# Patient Record
Sex: MALE | Race: WHITE | Employment: FULL TIME | URBAN - METROPOLITAN AREA
[De-identification: names, ages, dates, MRNs, and addresses within clinical notes are randomized per-mention and may not be internally consistent; named-entity substitution may affect disease eponyms.]

---

## 2017-01-13 ENCOUNTER — HOSPITAL ENCOUNTER (OUTPATIENT)
Dept: OTHER | Age: 57
Discharge: HOME | End: 2017-01-13
Attending: NURSE PRACTITIONER | Admitting: NURSE PRACTITIONER

## 2017-01-13 DIAGNOSIS — Z20.828 EXPOSURE TO SARS-ASSOCIATED CORONAVIRUS: ICD-10-CM

## 2017-04-20 ENCOUNTER — HOSPITAL ENCOUNTER (OUTPATIENT)
Dept: OTHER | Age: 57
Discharge: HOME | End: 2017-04-20
Attending: INTERNAL MEDICINE | Admitting: INTERNAL MEDICINE

## 2017-04-20 DIAGNOSIS — E78.5 ELEVATED LIPIDS: ICD-10-CM

## 2017-04-20 DIAGNOSIS — I10 ESSENTIAL HYPERTENSION: ICD-10-CM

## 2017-04-20 LAB
A/G RATIO: 1.8 (ref 1.1–2.2)
ALBUMIN SERPL-MCNC: 4.2 G/DL (ref 3.4–5)
ALP BLD-CCNC: 60 U/L (ref 40–129)
ALT SERPL-CCNC: 23 U/L (ref 10–40)
ANION GAP SERPL CALCULATED.3IONS-SCNC: 15 MMOL/L (ref 3–16)
AST SERPL-CCNC: 21 U/L (ref 15–37)
BILIRUB SERPL-MCNC: 0.3 MG/DL (ref 0–1)
BUN BLDV-MCNC: 19 MG/DL (ref 7–20)
CALCIUM SERPL-MCNC: 8.7 MG/DL (ref 8.3–10.6)
CHLORIDE BLD-SCNC: 104 MMOL/L (ref 99–110)
CHOLESTEROL, TOTAL: 156 MG/DL (ref 0–199)
CO2: 23 MMOL/L (ref 21–32)
CREAT SERPL-MCNC: 0.9 MG/DL (ref 0.9–1.3)
GFR AFRICAN AMERICAN: >60
GFR NON-AFRICAN AMERICAN: >60
GLOBULIN: 2.3 G/DL
GLUCOSE BLD-MCNC: 82 MG/DL (ref 70–99)
HDLC SERPL-MCNC: 53 MG/DL (ref 40–60)
LDL CHOLESTEROL CALCULATED: 91 MG/DL
POTASSIUM SERPL-SCNC: 3.9 MMOL/L (ref 3.5–5.1)
SODIUM BLD-SCNC: 142 MMOL/L (ref 136–145)
TOTAL PROTEIN: 6.5 G/DL (ref 6.4–8.2)
TRIGL SERPL-MCNC: 62 MG/DL (ref 0–150)
VLDLC SERPL CALC-MCNC: 12 MG/DL

## 2017-04-27 ENCOUNTER — OFFICE VISIT (OUTPATIENT)
Dept: INTERNAL MEDICINE CLINIC | Age: 57
End: 2017-04-27

## 2017-04-27 VITALS
BODY MASS INDEX: 27.96 KG/M2 | WEIGHT: 211 LBS | HEIGHT: 73 IN | OXYGEN SATURATION: 95 % | SYSTOLIC BLOOD PRESSURE: 136 MMHG | DIASTOLIC BLOOD PRESSURE: 74 MMHG | HEART RATE: 73 BPM

## 2017-04-27 DIAGNOSIS — E78.5 ELEVATED LIPIDS: ICD-10-CM

## 2017-04-27 DIAGNOSIS — G47.00 INSOMNIA, UNSPECIFIED TYPE: ICD-10-CM

## 2017-04-27 DIAGNOSIS — Z13.9 SCREENING: ICD-10-CM

## 2017-04-27 DIAGNOSIS — I10 ESSENTIAL HYPERTENSION: Primary | ICD-10-CM

## 2017-04-27 PROCEDURE — 99213 OFFICE O/P EST LOW 20 MIN: CPT | Performed by: INTERNAL MEDICINE

## 2017-04-27 RX ORDER — DOXAZOSIN MESYLATE 1 MG/1
TABLET ORAL
Qty: 180 TABLET | Refills: 3 | Status: SHIPPED | OUTPATIENT
Start: 2017-04-27 | End: 2018-04-08 | Stop reason: SDUPTHER

## 2017-06-12 ENCOUNTER — TELEPHONE (OUTPATIENT)
Dept: INTERNAL MEDICINE CLINIC | Age: 57
End: 2017-06-12

## 2017-06-12 DIAGNOSIS — G47.00 INSOMNIA, UNSPECIFIED TYPE: ICD-10-CM

## 2017-06-12 RX ORDER — NORTRIPTYLINE HYDROCHLORIDE 25 MG/1
CAPSULE ORAL
Qty: 270 CAPSULE | Refills: 3 | Status: SHIPPED | OUTPATIENT
Start: 2017-06-12 | End: 2020-01-20 | Stop reason: ALTCHOICE

## 2017-08-07 ENCOUNTER — OFFICE VISIT (OUTPATIENT)
Dept: DERMATOLOGY | Age: 57
End: 2017-08-07

## 2017-08-07 DIAGNOSIS — Z12.83 SCREENING EXAM FOR SKIN CANCER: ICD-10-CM

## 2017-08-07 DIAGNOSIS — L57.0 ACTINIC KERATOSES: Primary | ICD-10-CM

## 2017-08-07 PROCEDURE — 17003 DESTRUCT PREMALG LES 2-14: CPT | Performed by: DERMATOLOGY

## 2017-08-07 PROCEDURE — 17000 DESTRUCT PREMALG LESION: CPT | Performed by: DERMATOLOGY

## 2017-08-07 PROCEDURE — 99213 OFFICE O/P EST LOW 20 MIN: CPT | Performed by: DERMATOLOGY

## 2017-10-13 RX ORDER — AMLODIPINE BESYLATE 10 MG/1
TABLET ORAL
Qty: 45 TABLET | Refills: 3 | Status: SHIPPED | OUTPATIENT
Start: 2017-10-13 | End: 2018-11-19 | Stop reason: SDUPTHER

## 2017-10-13 RX ORDER — NIACIN 500 MG/1
TABLET, EXTENDED RELEASE ORAL
Qty: 90 TABLET | Refills: 3 | Status: SHIPPED | OUTPATIENT
Start: 2017-10-13 | End: 2018-09-24 | Stop reason: SDUPTHER

## 2017-10-21 ENCOUNTER — HOSPITAL ENCOUNTER (OUTPATIENT)
Dept: OTHER | Age: 57
Discharge: HOME | End: 2017-10-21
Attending: INTERNAL MEDICINE | Admitting: INTERNAL MEDICINE

## 2017-10-21 DIAGNOSIS — E78.5 ELEVATED LIPIDS: ICD-10-CM

## 2017-10-21 DIAGNOSIS — Z13.9 SCREENING FOR CONDITION: ICD-10-CM

## 2017-10-21 DIAGNOSIS — I10 ESSENTIAL HYPERTENSION: ICD-10-CM

## 2017-10-21 LAB
A/G RATIO: 1.6 (ref 1.1–2.2)
ALBUMIN SERPL-MCNC: 4.4 G/DL (ref 3.4–5)
ALP BLD-CCNC: 62 U/L (ref 40–129)
ALT SERPL-CCNC: 22 U/L (ref 10–40)
ANION GAP SERPL CALCULATED.3IONS-SCNC: 14 MMOL/L (ref 3–16)
AST SERPL-CCNC: 25 U/L (ref 15–37)
BILIRUB SERPL-MCNC: 0.4 MG/DL (ref 0–1)
BUN BLDV-MCNC: 15 MG/DL (ref 7–20)
CALCIUM SERPL-MCNC: 9.5 MG/DL (ref 8.3–10.6)
CHLORIDE BLD-SCNC: 101 MMOL/L (ref 99–110)
CHOLESTEROL, TOTAL: 172 MG/DL (ref 0–199)
CO2: 28 MMOL/L (ref 21–32)
CREAT SERPL-MCNC: 1 MG/DL (ref 0.9–1.3)
GFR AFRICAN AMERICAN: >60
GFR NON-AFRICAN AMERICAN: >60
GLOBULIN: 2.8 G/DL
GLUCOSE BLD-MCNC: 70 MG/DL (ref 70–99)
HDLC SERPL-MCNC: 53 MG/DL (ref 40–60)
LDL CHOLESTEROL CALCULATED: 92 MG/DL
POTASSIUM SERPL-SCNC: 4.1 MMOL/L (ref 3.5–5.1)
PROSTATE SPECIFIC ANTIGEN: 0.73 NG/ML (ref 0–4)
SODIUM BLD-SCNC: 143 MMOL/L (ref 136–145)
TOTAL PROTEIN: 7.2 G/DL (ref 6.4–8.2)
TRIGL SERPL-MCNC: 133 MG/DL (ref 0–150)
VLDLC SERPL CALC-MCNC: 27 MG/DL

## 2017-10-27 ENCOUNTER — OFFICE VISIT (OUTPATIENT)
Dept: INTERNAL MEDICINE CLINIC | Age: 57
End: 2017-10-27

## 2017-10-27 VITALS
OXYGEN SATURATION: 97 % | BODY MASS INDEX: 28.49 KG/M2 | DIASTOLIC BLOOD PRESSURE: 76 MMHG | HEART RATE: 86 BPM | SYSTOLIC BLOOD PRESSURE: 128 MMHG | WEIGHT: 215 LBS | HEIGHT: 73 IN

## 2017-10-27 DIAGNOSIS — Z23 NEED FOR IMMUNIZATION AGAINST INFLUENZA: ICD-10-CM

## 2017-10-27 DIAGNOSIS — G47.00 INSOMNIA, UNSPECIFIED TYPE: ICD-10-CM

## 2017-10-27 DIAGNOSIS — G47.33 OBSTRUCTIVE SLEEP APNEA SYNDROME: ICD-10-CM

## 2017-10-27 DIAGNOSIS — E78.5 ELEVATED LIPIDS: ICD-10-CM

## 2017-10-27 DIAGNOSIS — I10 ESSENTIAL HYPERTENSION: Primary | ICD-10-CM

## 2017-10-27 PROCEDURE — 99214 OFFICE O/P EST MOD 30 MIN: CPT | Performed by: INTERNAL MEDICINE

## 2017-10-27 PROCEDURE — 90471 IMMUNIZATION ADMIN: CPT | Performed by: INTERNAL MEDICINE

## 2017-10-27 PROCEDURE — 90688 IIV4 VACCINE SPLT 0.5 ML IM: CPT | Performed by: INTERNAL MEDICINE

## 2018-04-08 DIAGNOSIS — I10 ESSENTIAL HYPERTENSION: ICD-10-CM

## 2018-04-09 RX ORDER — DOXAZOSIN MESYLATE 1 MG/1
TABLET ORAL
Qty: 180 TABLET | Refills: 3 | Status: SHIPPED | OUTPATIENT
Start: 2018-04-09 | End: 2018-10-12 | Stop reason: SDUPTHER

## 2018-04-23 ENCOUNTER — HOSPITAL ENCOUNTER (OUTPATIENT)
Dept: OTHER | Age: 58
Discharge: HOME | End: 2018-04-23
Attending: INTERNAL MEDICINE | Admitting: INTERNAL MEDICINE

## 2018-04-23 DIAGNOSIS — I10 ESSENTIAL HYPERTENSION: ICD-10-CM

## 2018-04-23 DIAGNOSIS — E78.5 ELEVATED LIPIDS: ICD-10-CM

## 2018-04-23 LAB
A/G RATIO: 1.8 (ref 1.1–2.2)
ALBUMIN SERPL-MCNC: 4.7 G/DL (ref 3.4–5)
ALP BLD-CCNC: 64 U/L (ref 40–129)
ALT SERPL-CCNC: 33 U/L (ref 10–40)
ANION GAP SERPL CALCULATED.3IONS-SCNC: 16 MMOL/L (ref 3–16)
AST SERPL-CCNC: 49 U/L (ref 15–37)
BILIRUB SERPL-MCNC: 0.5 MG/DL (ref 0–1)
BUN BLDV-MCNC: 21 MG/DL (ref 7–20)
CALCIUM SERPL-MCNC: 9.2 MG/DL (ref 8.3–10.6)
CHLORIDE BLD-SCNC: 106 MMOL/L (ref 99–110)
CHOLESTEROL, TOTAL: 150 MG/DL (ref 0–199)
CO2: 24 MMOL/L (ref 21–32)
CREAT SERPL-MCNC: 0.9 MG/DL (ref 0.9–1.3)
GFR AFRICAN AMERICAN: >60
GFR NON-AFRICAN AMERICAN: >60
GLOBULIN: 2.6 G/DL
GLUCOSE BLD-MCNC: 86 MG/DL (ref 70–99)
HDLC SERPL-MCNC: 55 MG/DL (ref 40–60)
LDL CHOLESTEROL CALCULATED: 80 MG/DL
POTASSIUM SERPL-SCNC: 4 MMOL/L (ref 3.5–5.1)
SODIUM BLD-SCNC: 146 MMOL/L (ref 136–145)
TOTAL PROTEIN: 7.3 G/DL (ref 6.4–8.2)
TRIGL SERPL-MCNC: 76 MG/DL (ref 0–150)
VLDLC SERPL CALC-MCNC: 15 MG/DL

## 2018-04-27 ENCOUNTER — OFFICE VISIT (OUTPATIENT)
Dept: INTERNAL MEDICINE CLINIC | Age: 58
End: 2018-04-27

## 2018-04-27 VITALS
HEART RATE: 79 BPM | OXYGEN SATURATION: 98 % | BODY MASS INDEX: 29.03 KG/M2 | DIASTOLIC BLOOD PRESSURE: 82 MMHG | SYSTOLIC BLOOD PRESSURE: 136 MMHG | WEIGHT: 217 LBS

## 2018-04-27 DIAGNOSIS — G47.00 INSOMNIA, UNSPECIFIED TYPE: ICD-10-CM

## 2018-04-27 DIAGNOSIS — N52.9 ERECTILE DYSFUNCTION, UNSPECIFIED ERECTILE DYSFUNCTION TYPE: ICD-10-CM

## 2018-04-27 DIAGNOSIS — I10 ESSENTIAL HYPERTENSION: Primary | ICD-10-CM

## 2018-04-27 DIAGNOSIS — G47.33 OBSTRUCTIVE SLEEP APNEA SYNDROME: ICD-10-CM

## 2018-04-27 DIAGNOSIS — G25.81 RLS (RESTLESS LEGS SYNDROME): ICD-10-CM

## 2018-04-27 DIAGNOSIS — E78.5 ELEVATED LIPIDS: ICD-10-CM

## 2018-04-27 DIAGNOSIS — Z12.5 SPECIAL SCREENING FOR MALIGNANT NEOPLASM OF PROSTATE: ICD-10-CM

## 2018-04-27 PROCEDURE — 99214 OFFICE O/P EST MOD 30 MIN: CPT | Performed by: INTERNAL MEDICINE

## 2018-04-27 RX ORDER — SILDENAFIL CITRATE 20 MG/1
20 TABLET ORAL PRN
Qty: 20 TABLET | Refills: 3 | Status: SHIPPED | OUTPATIENT
Start: 2018-04-27 | End: 2020-01-20 | Stop reason: ALTCHOICE

## 2018-04-27 ASSESSMENT — PATIENT HEALTH QUESTIONNAIRE - PHQ9
SUM OF ALL RESPONSES TO PHQ9 QUESTIONS 1 & 2: 0
SUM OF ALL RESPONSES TO PHQ QUESTIONS 1-9: 0
2. FEELING DOWN, DEPRESSED OR HOPELESS: 0
1. LITTLE INTEREST OR PLEASURE IN DOING THINGS: 0

## 2018-08-13 ENCOUNTER — OFFICE VISIT (OUTPATIENT)
Dept: DERMATOLOGY | Age: 58
End: 2018-08-13

## 2018-08-13 DIAGNOSIS — M67.449 DIGITAL MUCINOUS CYST: ICD-10-CM

## 2018-08-13 DIAGNOSIS — L57.0 ACTINIC KERATOSES: Primary | ICD-10-CM

## 2018-08-13 DIAGNOSIS — Z12.83 SCREENING EXAM FOR SKIN CANCER: ICD-10-CM

## 2018-08-13 PROCEDURE — 17003 DESTRUCT PREMALG LES 2-14: CPT | Performed by: DERMATOLOGY

## 2018-08-13 PROCEDURE — 99213 OFFICE O/P EST LOW 20 MIN: CPT | Performed by: DERMATOLOGY

## 2018-08-13 PROCEDURE — 17000 DESTRUCT PREMALG LESION: CPT | Performed by: DERMATOLOGY

## 2018-08-13 NOTE — PROGRESS NOTES
[facility]) Dermatology  Meg Mancia MD  [phone]      Karishma Aid  1960    63 y.o. male     Date of Visit: 8/13/2018    Last Visit: 1yr    Chief Complaint: Skin check    History of Present Illness:  1. Here for skin check. History of actinic keratoses s/p cryotherapy. Unsure of new lesions   -Wears hat regularly. Does not like to use sunscreen. 2. Concerned about a raised lesion on R 3rd finger. No associated symptoms     Review of Systems:  Constitutional: Reports general sense of well-being. Skin: No interval severe sunburns. Allergies: Reviewed and updated. Past Medical History, Surgical History, Medications and Allergies reviewed.      Past Medical History:   Diagnosis Date    Allergic rhinitis     Chronic back pain     Chronic sinus infection     GERD (gastroesophageal reflux disease)     Hyperlipidemia     Hypertension     IBS (irritable bowel syndrome)     Insomnia     Irritable bladder     RLS (restless legs syndrome) 9/21/2012    Sleep apnea     uses mouthguard    Wears glasses      Past Surgical History:   Procedure Laterality Date    CATARACT REMOVAL WITH IMPLANT Right     COLONOSCOPY  12/2003    normal    COLONOSCOPY  10/24/14    NL Redo 10 yrs    CYST REMOVAL  1995    sebacous cyst head    EYE SURGERY Left 9/16/14    Phaco emulsification with IOL implant    SPINE SURGERY  2001    L5-S1 discectomy    SPINE SURGERY  2010    L4-L5 discectomy    TONSILLECTOMY AND ADENOIDECTOMY  1971       Allergies   Allergen Reactions    Entex [Ami-Selvin] Other (See Comments)     Difficulty urinating  insomnia     Outpatient Prescriptions Marked as Taking for the 8/13/18 encounter (Office Visit) with Meg Mancia MD   Medication Sig Dispense Refill    sildenafil (REVATIO) 20 MG tablet Take 1 tablet by mouth as needed (ED) 20 tablet 3    doxazosin (CARDURA) 1 MG tablet TAKE 1 TABLET TWICE A DAY FOR HYPERTENSION AND PROSTATE MEDICINE 180 tablet 3    amLODIPine (NORVASC) 10 MG tablet TAKE ONE-HALF (1/2) TABLET DAILY FOR HYPERTENSION 45 tablet 3    niacin (NIASPAN) 500 MG extended release tablet TAKE 1 TABLET NIGHTLY FOR HIGH CHOLESTEROL 90 tablet 3    gabapentin (NEURONTIN) 600 MG tablet TAKE ONE-HALF (1/2) TO 1 TABLET AT BEDTIME FOR NERVE PAIN / SLEEP. UNUSED HALF TABLET SHOULD BE TAKEN AS THE NEXT DOSE 90 tablet 3    nortriptyline (PAMELOR) 25 MG capsule TAKE TWO TO THREE CAPSULES BY MOUTH EVERY NIGHT AT BEDTIME AS NEEDED FOR SLEEP 270 capsule 3    Coenzyme Q10 (CO Q 10) 100 MG CAPS Take 1 tablet by mouth daily. Social History:     Physical Examination     The following were examined and determined to be normal: Psych/Neuro, Scalp/hair, Conjunctivae/eyelids, Gums/teeth/lips, Neck, Breast/axilla/chest, Abdomen, Back, RUE, LUE, RLE, LLE, and Genitalia/groin/buttocks. The following were examined and determined to be abnormal: Head/face. Nails/digits    -General: Well-appearing, NAD  1. R 1 and L 3 temples - ill-defined irregularly-shaped roughly-scaling thin pink macule(s)/papule(s)  2. Dorsum R 3rd finger at DIP joint - 4mm smooth firm skin-colored/translucent nodule     Assessment and Plan     1. Actinic keratosis(es)  -Edu re: relationship with chronic cumulative sun exposure, low premalignant potential.   -4 lesion(s) treated w/ liquid nitrogen x 2 cycles - R 1 and L 3 temples. Edu re: risk of blister formation, discomfort, scar, hypopigmentation. Discussed wound care. -Reviewed sun protective behavior -- sun avoidance during the peak hours of the day, sun-protective clothing (including hat and sunglasses), sunscreen use (water resistant, broad spectrum, SPF at least 30, need for reapplication every 2 to 3 hours). -Return for full skin exam in 1 year (sooner if indicated)      2. Nodule of R 3rd finger, likely digital mucinous cyst   -Reassurance re: benignity   -Discussed excision as only definitive treatment.  Pt not interested in treatment given no associated

## 2018-08-14 ENCOUNTER — TELEPHONE (OUTPATIENT)
Dept: INTERNAL MEDICINE CLINIC | Age: 58
End: 2018-08-14

## 2018-08-16 RX ORDER — KETOCONAZOLE 20 MG/G
CREAM TOPICAL
Qty: 30 G | Refills: 0 | Status: SHIPPED | OUTPATIENT
Start: 2018-08-16 | End: 2020-01-20 | Stop reason: ALTCHOICE

## 2018-09-24 RX ORDER — NIACIN 500 MG/1
TABLET, EXTENDED RELEASE ORAL
Qty: 90 TABLET | Refills: 3 | Status: SHIPPED | OUTPATIENT
Start: 2018-09-24 | End: 2019-08-15 | Stop reason: SDUPTHER

## 2018-10-08 RX ORDER — GABAPENTIN 600 MG/1
TABLET ORAL
Qty: 90 TABLET | Refills: 3 | Status: SHIPPED | OUTPATIENT
Start: 2018-10-08 | End: 2019-11-01 | Stop reason: SDUPTHER

## 2018-10-12 DIAGNOSIS — I10 ESSENTIAL HYPERTENSION: ICD-10-CM

## 2018-10-13 RX ORDER — DOXAZOSIN MESYLATE 1 MG/1
TABLET ORAL
Qty: 180 TABLET | Refills: 3 | Status: SHIPPED | OUTPATIENT
Start: 2018-10-13 | End: 2018-11-20 | Stop reason: SDUPTHER

## 2018-10-31 NOTE — PROGRESS NOTES
Vaccine Information Sheet, \"Influenza - Inactivated\"  given to Amber Miller, or parent/legal guardian of  Amber Miller and verbalized understanding. Patient responses:    Have you ever had a reaction to a flu vaccine? No  Are you able to eat eggs without adverse effects? Yes  Do you have any current illness? No  Have you ever had [place] Syndrome? No    Flu vaccine given per order. Please see immunization tab.

## 2018-11-02 ENCOUNTER — NURSE ONLY (OUTPATIENT)
Dept: INTERNAL MEDICINE CLINIC | Age: 58
End: 2018-11-02
Payer: COMMERCIAL

## 2018-11-02 ENCOUNTER — HOSPITAL ENCOUNTER (OUTPATIENT)
Age: 58
Discharge: HOME | End: 2018-11-02
Payer: COMMERCIAL

## 2018-11-02 DIAGNOSIS — I10 ESSENTIAL HYPERTENSION: ICD-10-CM

## 2018-11-02 DIAGNOSIS — Z23 NEED FOR PROPHYLACTIC VACCINATION AND INOCULATION AGAINST INFLUENZA: Primary | ICD-10-CM

## 2018-11-02 DIAGNOSIS — E78.5 ELEVATED LIPIDS: ICD-10-CM

## 2018-11-02 DIAGNOSIS — Z12.5 SPECIAL SCREENING FOR MALIGNANT NEOPLASM OF PROSTATE: ICD-10-CM

## 2018-11-02 LAB
A/G RATIO: 1.8 (ref 1.1–2.2)
ALBUMIN SERPL-MCNC: 4.7 G/DL (ref 3.4–5)
ALP BLD-CCNC: 68 U/L (ref 40–129)
ALT SERPL-CCNC: 25 U/L (ref 10–40)
ANION GAP SERPL CALCULATED.3IONS-SCNC: 12 MMOL/L (ref 3–16)
AST SERPL-CCNC: 24 U/L (ref 15–37)
BILIRUB SERPL-MCNC: 0.3 MG/DL (ref 0–1)
BUN BLDV-MCNC: 17 MG/DL (ref 7–20)
CALCIUM SERPL-MCNC: 9.3 MG/DL (ref 8.3–10.6)
CHLORIDE BLD-SCNC: 103 MMOL/L (ref 99–110)
CHOLESTEROL, TOTAL: 173 MG/DL (ref 0–199)
CO2: 27 MMOL/L (ref 21–32)
CREAT SERPL-MCNC: 1.1 MG/DL (ref 0.9–1.3)
GFR AFRICAN AMERICAN: >60
GFR NON-AFRICAN AMERICAN: >60
GLOBULIN: 2.6 G/DL
GLUCOSE BLD-MCNC: 93 MG/DL (ref 70–99)
HDLC SERPL-MCNC: 54 MG/DL (ref 40–60)
LDL CHOLESTEROL CALCULATED: 102 MG/DL
POTASSIUM SERPL-SCNC: 4.7 MMOL/L (ref 3.5–5.1)
PROSTATE SPECIFIC ANTIGEN: 0.64 NG/ML (ref 0–4)
SODIUM BLD-SCNC: 142 MMOL/L (ref 136–145)
TOTAL PROTEIN: 7.3 G/DL (ref 6.4–8.2)
TRIGL SERPL-MCNC: 85 MG/DL (ref 0–150)
VLDLC SERPL CALC-MCNC: 17 MG/DL

## 2018-11-02 PROCEDURE — 84153 ASSAY OF PSA TOTAL: CPT

## 2018-11-02 PROCEDURE — 90471 IMMUNIZATION ADMIN: CPT | Performed by: INTERNAL MEDICINE

## 2018-11-02 PROCEDURE — 36415 COLL VENOUS BLD VENIPUNCTURE: CPT

## 2018-11-02 PROCEDURE — 90686 IIV4 VACC NO PRSV 0.5 ML IM: CPT | Performed by: INTERNAL MEDICINE

## 2018-11-02 PROCEDURE — 80061 LIPID PANEL: CPT

## 2018-11-02 PROCEDURE — 80053 COMPREHEN METABOLIC PANEL: CPT

## 2018-11-19 ENCOUNTER — OFFICE VISIT (OUTPATIENT)
Dept: INTERNAL MEDICINE CLINIC | Age: 58
End: 2018-11-19
Payer: COMMERCIAL

## 2018-11-19 ENCOUNTER — HOSPITAL ENCOUNTER (OUTPATIENT)
Dept: GENERAL RADIOLOGY | Age: 58
Discharge: HOME | End: 2018-11-19
Payer: COMMERCIAL

## 2018-11-19 ENCOUNTER — HOSPITAL ENCOUNTER (OUTPATIENT)
Age: 58
Discharge: HOME | End: 2018-11-19
Payer: COMMERCIAL

## 2018-11-19 VITALS
OXYGEN SATURATION: 98 % | WEIGHT: 218 LBS | SYSTOLIC BLOOD PRESSURE: 144 MMHG | DIASTOLIC BLOOD PRESSURE: 84 MMHG | HEIGHT: 73 IN | BODY MASS INDEX: 28.89 KG/M2 | HEART RATE: 82 BPM

## 2018-11-19 DIAGNOSIS — M25.531 RIGHT WRIST PAIN: ICD-10-CM

## 2018-11-19 DIAGNOSIS — I10 ESSENTIAL HYPERTENSION: Primary | ICD-10-CM

## 2018-11-19 DIAGNOSIS — N52.9 ERECTILE DYSFUNCTION, UNSPECIFIED ERECTILE DYSFUNCTION TYPE: ICD-10-CM

## 2018-11-19 DIAGNOSIS — G25.81 RLS (RESTLESS LEGS SYNDROME): ICD-10-CM

## 2018-11-19 DIAGNOSIS — G47.33 OBSTRUCTIVE SLEEP APNEA SYNDROME: ICD-10-CM

## 2018-11-19 DIAGNOSIS — E78.5 ELEVATED LIPIDS: ICD-10-CM

## 2018-11-19 DIAGNOSIS — M54.50 ACUTE MIDLINE LOW BACK PAIN WITHOUT SCIATICA: ICD-10-CM

## 2018-11-19 PROCEDURE — 99214 OFFICE O/P EST MOD 30 MIN: CPT | Performed by: INTERNAL MEDICINE

## 2018-11-19 PROCEDURE — 73100 X-RAY EXAM OF WRIST: CPT

## 2018-11-19 RX ORDER — AMLODIPINE BESYLATE 10 MG/1
TABLET ORAL
Qty: 90 TABLET | Refills: 3 | Status: SHIPPED | OUTPATIENT
Start: 2018-11-19 | End: 2019-11-01 | Stop reason: SDUPTHER

## 2018-11-20 ENCOUNTER — TELEPHONE (OUTPATIENT)
Dept: INTERNAL MEDICINE CLINIC | Age: 58
End: 2018-11-20

## 2018-11-20 DIAGNOSIS — I10 ESSENTIAL HYPERTENSION: ICD-10-CM

## 2018-11-20 RX ORDER — DOXAZOSIN MESYLATE 1 MG/1
TABLET ORAL
Qty: 180 TABLET | Refills: 3 | Status: SHIPPED | OUTPATIENT
Start: 2018-11-20 | End: 2018-11-29 | Stop reason: SDUPTHER

## 2018-11-20 NOTE — TELEPHONE ENCOUNTER
Patient had Xray last night and would like a call daiana with results. Please call [phone] with results    He also said that [facility] only [facility] Doxazosin for 45 days  Unless PCP calls them about 90 day refills. Expecting a fax from the pharmacy regarding this.

## 2018-11-29 ENCOUNTER — TELEPHONE (OUTPATIENT)
Dept: INTERNAL MEDICINE CLINIC | Age: 58
End: 2018-11-29

## 2018-11-29 DIAGNOSIS — I10 ESSENTIAL HYPERTENSION: ICD-10-CM

## 2018-11-29 RX ORDER — DOXAZOSIN MESYLATE 4 MG/1
TABLET ORAL
Qty: 90 TABLET | Refills: 2 | Status: SHIPPED | OUTPATIENT
Start: 2018-11-29 | End: 2019-08-15 | Stop reason: SDUPTHER

## 2018-12-03 ENCOUNTER — TELEPHONE (OUTPATIENT)
Dept: INTERNAL MEDICINE CLINIC | Age: 58
End: 2018-12-03

## 2019-01-19 ENCOUNTER — TELEPHONE (OUTPATIENT)
Dept: INTERNAL MEDICINE CLINIC | Age: 59
End: 2019-01-19

## 2019-05-20 ENCOUNTER — HOSPITAL ENCOUNTER (OUTPATIENT)
Age: 59
Discharge: HOME | End: 2019-05-20
Payer: COMMERCIAL

## 2019-05-20 DIAGNOSIS — E78.5 ELEVATED LIPIDS: ICD-10-CM

## 2019-05-20 DIAGNOSIS — I10 ESSENTIAL HYPERTENSION: ICD-10-CM

## 2019-05-20 LAB
A/G RATIO: 1.8 (ref 1.1–2.2)
ALBUMIN SERPL-MCNC: 4.3 G/DL (ref 3.4–5)
ALP BLD-CCNC: 66 U/L (ref 40–129)
ALT SERPL-CCNC: 25 U/L (ref 10–40)
ANION GAP SERPL CALCULATED.3IONS-SCNC: 11 MMOL/L (ref 3–16)
AST SERPL-CCNC: 28 U/L (ref 15–37)
BILIRUB SERPL-MCNC: 0.5 MG/DL (ref 0–1)
BUN BLDV-MCNC: 19 MG/DL (ref 7–20)
CALCIUM SERPL-MCNC: 9.1 MG/DL (ref 8.3–10.6)
CHLORIDE BLD-SCNC: 108 MMOL/L (ref 99–110)
CHOLESTEROL, TOTAL: 133 MG/DL (ref 0–199)
CO2: 26 MMOL/L (ref 21–32)
CREAT SERPL-MCNC: 0.8 MG/DL (ref 0.9–1.3)
GFR AFRICAN AMERICAN: >60
GFR NON-AFRICAN AMERICAN: >60
GLOBULIN: 2.4 G/DL
GLUCOSE BLD-MCNC: 96 MG/DL (ref 70–99)
HDLC SERPL-MCNC: 55 MG/DL (ref 40–60)
LDL CHOLESTEROL CALCULATED: 66 MG/DL
POTASSIUM SERPL-SCNC: 4.4 MMOL/L (ref 3.5–5.1)
SODIUM BLD-SCNC: 145 MMOL/L (ref 136–145)
TOTAL PROTEIN: 6.7 G/DL (ref 6.4–8.2)
TRIGL SERPL-MCNC: 59 MG/DL (ref 0–150)
VLDLC SERPL CALC-MCNC: 12 MG/DL

## 2019-05-20 PROCEDURE — 36415 COLL VENOUS BLD VENIPUNCTURE: CPT

## 2019-05-20 PROCEDURE — 80053 COMPREHEN METABOLIC PANEL: CPT

## 2019-05-20 PROCEDURE — 80061 LIPID PANEL: CPT

## 2019-05-24 ENCOUNTER — OFFICE VISIT (OUTPATIENT)
Dept: INTERNAL MEDICINE CLINIC | Age: 59
End: 2019-05-24
Payer: COMMERCIAL

## 2019-05-24 VITALS
BODY MASS INDEX: 26.9 KG/M2 | HEART RATE: 79 BPM | WEIGHT: 203 LBS | DIASTOLIC BLOOD PRESSURE: 86 MMHG | OXYGEN SATURATION: 98 % | HEIGHT: 73 IN | SYSTOLIC BLOOD PRESSURE: 134 MMHG

## 2019-05-24 DIAGNOSIS — E78.5 ELEVATED LIPIDS: ICD-10-CM

## 2019-05-24 DIAGNOSIS — I10 ESSENTIAL HYPERTENSION: Primary | ICD-10-CM

## 2019-05-24 PROCEDURE — 99214 OFFICE O/P EST MOD 30 MIN: CPT | Performed by: NURSE PRACTITIONER

## 2019-05-24 ASSESSMENT — PATIENT HEALTH QUESTIONNAIRE - PHQ9
SUM OF ALL RESPONSES TO PHQ QUESTIONS 1-9: 0
SUM OF ALL RESPONSES TO PHQ QUESTIONS 1-9: 0
SUM OF ALL RESPONSES TO PHQ9 QUESTIONS 1 & 2: 0
1. LITTLE INTEREST OR PLEASURE IN DOING THINGS: 0
2. FEELING DOWN, DEPRESSED OR HOPELESS: 0

## 2019-05-24 ASSESSMENT — ENCOUNTER SYMPTOMS
ABDOMINAL PAIN: 0
VOMITING: 0
BLOOD IN STOOL: 0
NAUSEA: 0
CONSTIPATION: 0
COUGH: 0
DIARRHEA: 0
WHEEZING: 0
EYE DISCHARGE: 0
SHORTNESS OF BREATH: 0

## 2019-05-24 NOTE — PROGRESS NOTES
05/24/19    Andree Bowman  63 y.o.  male    Chief Complaint   Patient presents with    Hypertension    Hyperlipidemia         HPI:     Pt presents for 6 month evaluation of HTN, insomnia, JUSTICE and ED. HTN: at last OV norvasc was increased to 10 mg daily. Over the last 6 months he has lost 15 pounds. BP WNL today. INSOMNIA: no new complaints, still using gabapentin and  pamelor without any problem. JUSTICE: uses mouth appliance without difficulty. Wife never notices apneic episodes. He feels that he sleeps well.     ED: Uses medications PRN    Lab Results   Component Value Date     05/20/2019    K 4.4 05/20/2019     05/20/2019    CO2 26 05/20/2019    BUN 19 05/20/2019    CREATININE 0.8 (L) 05/20/2019    GLUCOSE 96 05/20/2019    CALCIUM 9.1 05/20/2019    PROT 6.7 05/20/2019    LABALBU 4.3 05/20/2019    BILITOT 0.5 05/20/2019    ALKPHOS 66 05/20/2019    AST 28 05/20/2019    ALT 25 05/20/2019    LABGLOM >60 05/20/2019    GFRAA >60 05/20/2019    AGRATIO 1.8 05/20/2019    GLOB 2.4 05/20/2019     Lab Results   Component Value Date    CHOL 133 05/20/2019    CHOL 173 11/02/2018    CHOL 150 04/23/2018     Lab Results   Component Value Date    TRIG 59 05/20/2019    TRIG 85 11/02/2018    TRIG 76 04/23/2018     Lab Results   Component Value Date    HDL 55 05/20/2019    HDL 54 11/02/2018    HDL 55 04/23/2018     Lab Results   Component Value Date    LDLCALC 66 05/20/2019    LDLCALC 102 (H) 11/02/2018    LDLCALC 80 04/23/2018     Lab Results   Component Value Date    LABVLDL 12 05/20/2019    LABVLDL 17 11/02/2018    LABVLDL 15 04/23/2018         /86 (Site: Right Upper Arm, Position: Sitting, Cuff Size: Medium Adult)   Pulse 79   Ht 6' 0.5\" (1.842 m)   Wt 203 lb (92.1 kg)   SpO2 98%   BMI 27.15 kg/m²        Current Outpatient Medications   Medication Sig Dispense Refill    doxazosin (CARDURA) 4 MG tablet TAKE 1/2 TABLET TWICE A DAY FOR HYPERTENSION AND PROSTATE MEDICINE 90 tablet 2    amLODIPine (NORVASC) 10 MG tablet TAKE ONE TABLET DAILY FOR HYPERTENSION 90 tablet 3    niacin (NIASPAN) 500 MG extended release tablet TAKE 1 TABLET NIGHTLY FOR HIGH CHOLESTEROL 90 tablet 3    ketoconazole (NIZORAL) 2 % cream Apply topically daily x 2 weeks. As needed. 30 g 0    sildenafil (REVATIO) 20 MG tablet Take 1 tablet by mouth as needed (ED) 20 tablet 3    nortriptyline (PAMELOR) 25 MG capsule TAKE TWO TO THREE CAPSULES BY MOUTH EVERY NIGHT AT BEDTIME AS NEEDED FOR SLEEP 270 capsule 3    Coenzyme Q10 (CO Q 10) 100 MG CAPS Take 1 tablet by mouth daily.  gabapentin (NEURONTIN) 600 MG tablet TAKE ONE-HALF (1/2) TO 1 TABLET AT BEDTIME FOR NERVE PAIN / SLEEP. UNUSED HALF TABLET SHOULD BE TAKEN AS THE NEXT DOSE 90 tablet 3     No current facility-administered medications for this visit.         Social History     Socioeconomic History    Marital status:      Spouse name: Not on file    Number of children: Not on file    Years of education: Not on file    Highest education level: Not on file   Occupational History    Not on file   Social Needs    Financial resource strain: Not on file    Food insecurity:     Worry: Not on file     Inability: Not on file    Transportation needs:     Medical: Not on file     Non-medical: Not on file   Tobacco Use    Smoking status: Never Smoker    Smokeless tobacco: Never Used   Substance and Sexual Activity    Alcohol use: Yes     Comment: once a week    Drug use: No    Sexual activity: Yes     Partners: Female   Lifestyle    Physical activity:     Days per week: Not on file     Minutes per session: Not on file    Stress: Not on file   Relationships    Social connections:     Talks on phone: Not on file     Gets together: Not on file     Attends Jew service: Not on file     Active member of club or organization: Not on file     Attends meetings of clubs or organizations: Not on file     Relationship status: Not on file    Intimate partner violence: Fear of current or ex partner: Not on file     Emotionally abused: Not on file     Physically abused: Not on file     Forced sexual activity: Not on file   Other Topics Concern    Not on file   Social History Narrative    Not on file       Family History   Problem Relation Age of Onset    Cancer Mother         breast    High Blood Pressure Mother     Thyroid Disease Mother     Heart Disease Father 72        PTCA / CAD    Diabetes Father     Kidney Disease Father         kidney stones    High Cholesterol Father     Other Father         endartectomy    Cancer Sister         skin cancer       Past Medical History:   Diagnosis Date    Allergic rhinitis     Chronic back pain     Chronic sinus infection     GERD (gastroesophageal reflux disease)     Hyperlipidemia     Hypertension     IBS (irritable bowel syndrome)     Insomnia     Irritable bladder     RLS (restless legs syndrome) 9/21/2012    Sleep apnea     uses mouthguard    Wears glasses        Review of Systems   Constitutional: Negative for fever. HENT: Negative for congestion. Eyes: Negative for discharge. Respiratory: Negative for cough, shortness of breath and wheezing. Cardiovascular: Negative for chest pain, palpitations and leg swelling. Gastrointestinal: Negative for abdominal pain, blood in stool, constipation, diarrhea, nausea and vomiting. Genitourinary: Negative for dysuria and hematuria. Musculoskeletal: Negative for myalgias. Skin: Negative for rash. Neurological: Negative for dizziness. Psychiatric/Behavioral: The patient is not nervous/anxious. Physical Exam   Constitutional: He is oriented to person, place, and time. No distress. HENT:   Right Ear: External ear normal.   Left Ear: External ear normal.   Mouth/Throat: Oropharynx is clear and moist. No oropharyngeal exudate. Eyes: Right eye exhibits no discharge. Left eye exhibits no discharge. No scleral icterus. Neck: Normal range of motion. No thyromegaly present. Cardiovascular: Normal rate, regular rhythm, normal heart sounds and intact distal pulses. Exam reveals no gallop and no friction rub. No murmur heard. Pulmonary/Chest: Effort normal and breath sounds normal. He has no wheezes. Abdominal: Soft. Bowel sounds are normal. He exhibits no distension. There is no tenderness. Musculoskeletal: Normal range of motion. He exhibits no edema or tenderness. Lymphadenopathy:     He has no cervical adenopathy. Neurological: He is alert and oriented to person, place, and time. Skin: Skin is warm and dry. No rash noted. He is not diaphoretic. No erythema. Psychiatric: Judgment normal.     1. Essential hypertension  Reviewed improvement in numbers  Continue current medications  Continue to follow healthier guidelines in food choices  - Comprehensive Metabolic Panel; Future    2. Elevated lipids  Reviewed labs with very good results  Praised him again for the lifestyle changes that have improved his health status  Continue niacin   Continue  - Lipid Panel;  Future    Repeat labs in 6 months, then return to see KATIE Zamudio - CNP

## 2019-08-15 DIAGNOSIS — I10 ESSENTIAL HYPERTENSION: ICD-10-CM

## 2019-08-15 RX ORDER — DOXAZOSIN MESYLATE 4 MG/1
TABLET ORAL
Qty: 90 TABLET | Refills: 4 | Status: SHIPPED | OUTPATIENT
Start: 2019-08-15 | End: 2020-09-09

## 2019-08-15 RX ORDER — NIACIN 500 MG/1
TABLET, EXTENDED RELEASE ORAL
Qty: 90 TABLET | Refills: 4 | Status: SHIPPED | OUTPATIENT
Start: 2019-08-15 | End: 2020-09-09

## 2019-08-15 NOTE — TELEPHONE ENCOUNTER
Refill request for NIACIN ER TABS 500MG last refilled on 9/24/18 and DOXAZOSIN MESYL TABS 4MG last refilled on 11/29/18.      Name of [facility]    Last visit - 5/24/2019       Pending visit -   Future Appointments   Date Time Provider Pal Mariana   12/2/2019  4:00 PM MD UYEN Clarke AND DANIEL QIU   1/28/2020  3:00 PM MD Nato Royal       Medication Contract signed -4/27/18   Last Oarrs ran-     Additional Comments

## 2019-11-26 ENCOUNTER — HOSPITAL ENCOUNTER (OUTPATIENT)
Age: 59
Discharge: HOME | End: 2019-11-26
Payer: COMMERCIAL

## 2019-11-26 ENCOUNTER — NURSE ONLY (OUTPATIENT)
Dept: INTERNAL MEDICINE CLINIC | Age: 59
End: 2019-11-26
Payer: COMMERCIAL

## 2019-11-26 DIAGNOSIS — I10 ESSENTIAL HYPERTENSION: ICD-10-CM

## 2019-11-26 DIAGNOSIS — E78.5 ELEVATED LIPIDS: ICD-10-CM

## 2019-11-26 LAB
A/G RATIO: 1.7 (ref 1.1–2.2)
ALBUMIN SERPL-MCNC: 4.3 G/DL (ref 3.4–5)
ALP BLD-CCNC: 66 U/L (ref 40–129)
ALT SERPL-CCNC: 24 U/L (ref 10–40)
ANION GAP SERPL CALCULATED.3IONS-SCNC: 11 MMOL/L (ref 3–16)
AST SERPL-CCNC: 24 U/L (ref 15–37)
BILIRUB SERPL-MCNC: 0.7 MG/DL (ref 0–1)
BUN BLDV-MCNC: 17 MG/DL (ref 7–20)
CALCIUM SERPL-MCNC: 9.2 MG/DL (ref 8.3–10.6)
CHLORIDE BLD-SCNC: 104 MMOL/L (ref 99–110)
CHOLESTEROL, TOTAL: 158 MG/DL (ref 0–199)
CO2: 26 MMOL/L (ref 21–32)
CREAT SERPL-MCNC: 0.9 MG/DL (ref 0.9–1.3)
GFR AFRICAN AMERICAN: >60
GFR NON-AFRICAN AMERICAN: >60
GLOBULIN: 2.5 G/DL
GLUCOSE BLD-MCNC: 87 MG/DL (ref 70–99)
HDLC SERPL-MCNC: 75 MG/DL (ref 40–60)
LDL CHOLESTEROL CALCULATED: 65 MG/DL
POTASSIUM SERPL-SCNC: 4.5 MMOL/L (ref 3.5–5.1)
SODIUM BLD-SCNC: 141 MMOL/L (ref 136–145)
TOTAL PROTEIN: 6.8 G/DL (ref 6.4–8.2)
TRIGL SERPL-MCNC: 90 MG/DL (ref 0–150)
VLDLC SERPL CALC-MCNC: 18 MG/DL

## 2019-11-26 PROCEDURE — 36415 COLL VENOUS BLD VENIPUNCTURE: CPT

## 2019-11-26 PROCEDURE — 90686 IIV4 VACC NO PRSV 0.5 ML IM: CPT | Performed by: INTERNAL MEDICINE

## 2019-11-26 PROCEDURE — 90471 IMMUNIZATION ADMIN: CPT | Performed by: INTERNAL MEDICINE

## 2019-11-26 PROCEDURE — 80061 LIPID PANEL: CPT

## 2019-11-26 PROCEDURE — 80053 COMPREHEN METABOLIC PANEL: CPT

## 2019-12-05 ENCOUNTER — OFFICE VISIT (OUTPATIENT)
Dept: ORTHOPEDIC SURGERY | Age: 59
End: 2019-12-05
Payer: COMMERCIAL

## 2019-12-05 VITALS — HEIGHT: 73 IN | WEIGHT: 203.04 LBS | BODY MASS INDEX: 26.91 KG/M2

## 2019-12-05 DIAGNOSIS — M75.82 ROTATOR CUFF TENDINITIS, LEFT: ICD-10-CM

## 2019-12-05 DIAGNOSIS — M25.512 LEFT SHOULDER PAIN, UNSPECIFIED CHRONICITY: Primary | ICD-10-CM

## 2019-12-05 PROCEDURE — 99203 OFFICE O/P NEW LOW 30 MIN: CPT | Performed by: ORTHOPAEDIC SURGERY

## 2019-12-10 ENCOUNTER — HOSPITAL ENCOUNTER (OUTPATIENT)
Dept: PHYSICAL THERAPY | Age: 59
Setting detail: THERAPIES SERIES
Discharge: HOME | End: 2019-12-10
Payer: COMMERCIAL

## 2019-12-10 PROCEDURE — 97110 THERAPEUTIC EXERCISES: CPT | Performed by: PHYSICAL THERAPIST

## 2019-12-10 PROCEDURE — 97161 PT EVAL LOW COMPLEX 20 MIN: CPT | Performed by: PHYSICAL THERAPIST

## 2019-12-18 ENCOUNTER — HOSPITAL ENCOUNTER (OUTPATIENT)
Dept: PHYSICAL THERAPY | Age: 59
Setting detail: THERAPIES SERIES
Discharge: HOME | End: 2019-12-18
Payer: COMMERCIAL

## 2019-12-18 PROCEDURE — 97110 THERAPEUTIC EXERCISES: CPT | Performed by: PHYSICAL THERAPIST

## 2019-12-18 PROCEDURE — 97140 MANUAL THERAPY 1/> REGIONS: CPT | Performed by: PHYSICAL THERAPIST

## 2019-12-26 ENCOUNTER — HOSPITAL ENCOUNTER (OUTPATIENT)
Dept: PHYSICAL THERAPY | Age: 59
Setting detail: THERAPIES SERIES
Discharge: HOME | End: 2019-12-26
Payer: COMMERCIAL

## 2019-12-26 PROCEDURE — 97110 THERAPEUTIC EXERCISES: CPT | Performed by: PHYSICAL THERAPIST

## 2019-12-26 PROCEDURE — 97140 MANUAL THERAPY 1/> REGIONS: CPT | Performed by: PHYSICAL THERAPIST

## 2020-01-06 ENCOUNTER — OFFICE VISIT (OUTPATIENT)
Dept: INTERNAL MEDICINE CLINIC | Age: 60
End: 2020-01-06
Payer: COMMERCIAL

## 2020-01-06 VITALS
BODY MASS INDEX: 25.93 KG/M2 | HEART RATE: 97 BPM | WEIGHT: 202 LBS | DIASTOLIC BLOOD PRESSURE: 78 MMHG | RESPIRATION RATE: 16 BRPM | HEIGHT: 74 IN | SYSTOLIC BLOOD PRESSURE: 124 MMHG | OXYGEN SATURATION: 97 %

## 2020-01-06 PROCEDURE — 99213 OFFICE O/P EST LOW 20 MIN: CPT | Performed by: INTERNAL MEDICINE

## 2020-01-06 ASSESSMENT — PATIENT HEALTH QUESTIONNAIRE - PHQ9
SUM OF ALL RESPONSES TO PHQ9 QUESTIONS 1 & 2: 0
SUM OF ALL RESPONSES TO PHQ QUESTIONS 1-9: 0
SUM OF ALL RESPONSES TO PHQ QUESTIONS 1-9: 0
1. LITTLE INTEREST OR PLEASURE IN DOING THINGS: 0
2. FEELING DOWN, DEPRESSED OR HOPELESS: 0

## 2020-01-06 NOTE — PROGRESS NOTES
2020     Vicki Elizondo (:  1960) is a 63 y.o. male, here for evaluation of the following medical concerns:  CC: JUSTICE  HPI  : Patient with HTN, JUSTICE, insomnia. Reviewed: 2019: Office visit: Dr. Jolene Swanson left shoulder rotator cuff tendinitis. Treat with physical therapy. Patient complains of difficulty with insomnia but is not been using his nortriptyline. Patient has health wellness form to be completed  Patient notes recent URI symptoms with cough and head congestion. No fever chills or shakes. Reviewed laboratory data 2019: Chemistry panel: Unremarkable. Lipid panel: Total cholesterol: 158. LDL cholesterol: 65.    Review of Systems    Review of Systems   Constitutional: negative   HENT: URI symptoms. EYES: negative   Respiratory: negative   Gastrointestinal: negative   Endocrine: negative   Musculoskeletal: Left-sided shoulder pain for which he has seen orthopedics as mentioned above. Skin: negative   Allergic/Immunological: negative   Hematological: negative   Psychiatric/Behavorial: Insomnia. CV: negative   CNS: negative   :Negative   S/E:Negative  Renal: Negative      Prior to Visit Medications    Medication Sig Taking? Authorizing Provider   amLODIPine (NORVASC) 10 MG tablet TAKE 1 TABLET DAILY FOR HYPERTENSION Yes Clint Wright MD   gabapentin (NEURONTIN) 600 MG tablet TAKE ONE-HALF (1/2) TO 1 TABLET AT BEDTIME FOR NERVE PAIN / SLEEP. UNUSED HALF TABLET SHOULD BE TAKEN AS THE NEXT DOSE Yes Mary Jo Wright MD   niacin (NIASPAN) 500 MG extended release tablet TAKE 1 TABLET NIGHTLY FOR HIGH CHOLESTEROL Yes KATIE Nguyen CNP   doxazosin (CARDURA) 4 MG tablet TAKE ONE-HALF (1/2) TABLET TWICE A DAY FOR HYPERTENSION AND PROSTATE MEDICINE Yes KATIE Nguyen CNP   ketoconazole (NIZORAL) 2 % cream Apply topically daily x 2 weeks. As needed.  Yes Diana Wright MD   sildenafil (REVATIO) 20 MG tablet Take 1 tablet by mouth as needed (ED) Yes Judit Kaye MD   nortriptyline (PAMELOR) 25 MG capsule TAKE TWO TO THREE CAPSULES BY MOUTH EVERY NIGHT AT BEDTIME AS NEEDED FOR SLEEP Yes Clint Wright MD   Coenzyme Q10 (CO Q 10) 100 MG CAPS Take 1 tablet by mouth daily. Yes Historical Provider, MD        Social History     Tobacco Use    Smoking status: Never Smoker    Smokeless tobacco: Never Used   Substance Use Topics    Alcohol use: Yes     Comment: once a week        Vitals:    01/06/20 1530   BP: 124/78   Site: Right Upper Arm   Position: Sitting   Cuff Size: Large Adult   Pulse: 97   Resp: 16   SpO2: 97%   Weight: 202 lb (91.6 kg)   Height: 6' 1.62\" (1.87 m)     Estimated body mass index is 26.2 kg/m² as calculated from the following:    Height as of this encounter: 6' 1.62\" (1.87 m). Weight as of this encounter: 202 lb (91.6 kg). Physical Exam  Head/neck: Ears: Normal TM. No obstruction. Throat: No exudates, no erythema, no tonsillar enlargement. Neck: No lymphadenopathy. Thyroid not palpable eyes: EOMI, PERRLA with no nystagmus  Lungs: Clear to auscultation. CV: S1-S2 normal.   RODERICK murmur. Carotid: No bruit. Abdominal Examination: Bowel sounds present. Soft nontender. No mass no guarding or   rebound. Spine/extremities: No edema. No tenderness to palpation. Skin: No rash  CNS: Patient is alert, cooperative, moves all 4 limbs, ambulates without difficulty, light touch normal.  Deep tendon reflexes normal.  Good orientation. Blood pressure 124/78, pulse 97, resp. rate 16, height 6' 1.62\" (1.87 m), weight 202 lb (91.6 kg), SpO2 97 %. ASSESSMENT/PLAN:  1. Essential hypertension  Stable. Continue present medicine  - Comprehensive Metabolic Panel; Future    2. Elevated lipids  Stable. Continue present medicine  - Lipid Panel; Future    3. Obstructive sleep apnea syndrome  Stable. Continue present treatment    4. Insomnia, unspecified type     Restart nortriptyline he has at home    5.  Special screening for malignant neoplasm of prostate  Screening.   - PSA,

## 2020-01-20 ENCOUNTER — OFFICE VISIT (OUTPATIENT)
Dept: DERMATOLOGY | Age: 60
End: 2020-01-20
Payer: COMMERCIAL

## 2020-01-20 PROCEDURE — 99213 OFFICE O/P EST LOW 20 MIN: CPT | Performed by: DERMATOLOGY

## 2020-01-20 PROCEDURE — 11102 TANGNTL BX SKIN SINGLE LES: CPT | Performed by: DERMATOLOGY

## 2020-01-20 PROCEDURE — 17003 DESTRUCT PREMALG LES 2-14: CPT | Performed by: DERMATOLOGY

## 2020-01-20 PROCEDURE — 17000 DESTRUCT PREMALG LESION: CPT | Performed by: DERMATOLOGY

## 2020-01-20 NOTE — PROGRESS NOTES
TAKE ONE-HALF (1/2) TO 1 TABLET AT BEDTIME FOR NERVE PAIN / SLEEP. UNUSED HALF TABLET SHOULD BE TAKEN AS THE NEXT DOSE 90 tablet 3    niacin (NIASPAN) 500 MG extended release tablet TAKE 1 TABLET NIGHTLY FOR HIGH CHOLESTEROL 90 tablet 4    doxazosin (CARDURA) 4 MG tablet TAKE ONE-HALF (1/2) TABLET TWICE A DAY FOR HYPERTENSION AND PROSTATE MEDICINE 90 tablet 4    Coenzyme Q10 (CO Q 10) 100 MG CAPS Take 1 tablet by mouth daily. Social History:     Physical Examination     The following were examined and determined to be normal: Psych/Neuro, Scalp/hair, Conjunctivae/eyelids, Gums/teeth/lips, , Abdomen, Back, RUE, LUE, RLE, LLE,  Nails/digits and Genitalia/groin/buttocks. The following were examined and determined to be abnormal: Head/face, Neck, Breast/axilla/chest.     -General: Well-appearing, NAD  1. Midline frontal scalp 2, L temple 2, R temple 1 - ill-defined irregularly-shaped roughly-scaling thin pink macule(s)/papule(s)   2. L medial chest - 7mm round telangiectatic pink papule       3. R/L neck base - bright red round papules/macules     Assessment and Plan     1. Actinic keratosis(es)  -Edu re: relationship with chronic cumulative sun exposure, low premalignant potential.   -5 lesion(s) treated w/ liquid nitrogen x 2 cycles - Midline frontal scalp 2, L temple 2, R temple 1. Edu re: risk of blister formation, discomfort, scar, hypopigmentation. Discussed wound care. -Reviewed sun protective behavior -- sun avoidance during the peak hours of the day, sun-protective clothing (including hat and sunglasses), sunscreen use (water resistant, broad spectrum, SPF at least 30, need for reapplication every 2 to 3 hours). -Return for full skin exam in 1 year (sooner if indicated)      2. Neoplasm of uncertain behavior of skin - R/o BCC, L medial chest   -Discussed possible diagnosis. Patient agreeable to biopsy.  Verbal consent obtained after risks (infection, bleeding, scar), benefits and alternatives explained. -Area(s) to be biopsied were marked with a surgical pen. Site(s) were cleansed with alcohol. Local anesthesia achieved with 1% lidocaine with epinephrine/sodium bicarbonate. Shave biopsy performed with a razor blade. Hemostasis was achieved with aluminum chloride. The wound(s) were dressed with petrolatum and covered with a bandage. Specimen(s) sent to pathology. Pt educated re: risk of bleeding, infection, scar and wound care instructions. 3. Cherry angioma(s)  -Reassurance re: benign-appearance of lesion(s). -No treatment performed today. Discussed elective Vbeam laser treatment.

## 2020-01-23 LAB — DERMATOLOGY PATHOLOGY REPORT: ABNORMAL

## 2020-01-30 ENCOUNTER — TELEPHONE (OUTPATIENT)
Dept: DERMATOLOGY | Age: 60
End: 2020-01-30

## 2020-01-30 NOTE — TELEPHONE ENCOUNTER
Called and spk to pt about result. Pt unable to schedule nazia  Will call back in the morning to schedule  pls schedule 2/27/20 procedure slot. Thanks.

## 2020-01-31 NOTE — TELEPHONE ENCOUNTER
Pt called back and schedule apt for excision on 2/27 @ 9:30 arriving @ 9:00. Need pre procedure letter to be sent.

## 2020-02-13 ENCOUNTER — OFFICE VISIT (OUTPATIENT)
Dept: INTERNAL MEDICINE CLINIC | Age: 60
End: 2020-02-13
Payer: COMMERCIAL

## 2020-02-13 VITALS
SYSTOLIC BLOOD PRESSURE: 124 MMHG | TEMPERATURE: 98.3 F | DIASTOLIC BLOOD PRESSURE: 80 MMHG | BODY MASS INDEX: 26.46 KG/M2 | HEART RATE: 81 BPM | OXYGEN SATURATION: 99 % | WEIGHT: 204 LBS

## 2020-02-13 PROCEDURE — 99213 OFFICE O/P EST LOW 20 MIN: CPT | Performed by: INTERNAL MEDICINE

## 2020-02-13 RX ORDER — AZELASTINE 1 MG/ML
1 SPRAY, METERED NASAL 2 TIMES DAILY
Qty: 1 BOTTLE | Refills: 0 | Status: SHIPPED
Start: 2020-02-13 | End: 2020-07-20 | Stop reason: CLARIF

## 2020-02-13 ASSESSMENT — PATIENT HEALTH QUESTIONNAIRE - PHQ9
SUM OF ALL RESPONSES TO PHQ QUESTIONS 1-9: 0
SUM OF ALL RESPONSES TO PHQ9 QUESTIONS 1 & 2: 0
2. FEELING DOWN, DEPRESSED OR HOPELESS: 0
SUM OF ALL RESPONSES TO PHQ QUESTIONS 1-9: 0
1. LITTLE INTEREST OR PLEASURE IN DOING THINGS: 0

## 2020-02-13 NOTE — PROGRESS NOTES
2020     Carmen Bo (:  1960) is a 63 y.o. male, here for evaluation of the following medical concerns:  CC: URI  HPI  Patient with HTN, hyperlipidemia, JUSTICE, insomnia. Patient relates early in January the onset of nasal congestion and drainage. States was seen at the [address] [place] [address] approximately 2020 treated with prednisone. This felt better with less sinus congestion but about 2 weeks ago noted the onset of nasal congestion although minimal discharge. Denies fever chills or shakes. No muscular aches or pains. Complains of upper chest congestion with occasional cough but nonproductive. No significant pharyngitis. Ears have felt somewhat clogged up but not painful. Patient has been using over-the-counter saline sinus rinse as well as Flonase. This seems to help out somewhat. Review of Systems  Review of Systems   Constitutional: negative   HENT: URI symptoms as noted above. EYES: negative   Respiratory: negative   Gastrointestinal: negative   Endocrine: negative   Musculoskeletal: negative   Skin: negative   Allergic/Immunological: negative   Hematological: negative   Psychiatric/Behavorial: negative   CV: negative   CNS: negative   :Negative   S/E:Negative  Renal: Negative      Prior to Visit Medications    Medication Sig Taking? Authorizing Provider   azelastine (ASTELIN) 0.1 % nasal spray 1 spray by Nasal route 2 times daily Use in each nostril as directed Yes Lynda Wright MD   amLODIPine (NORVASC) 10 MG tablet TAKE 1 TABLET DAILY FOR HYPERTENSION Yes Lynda Wright MD   niacin (NIASPAN) 500 MG extended release tablet TAKE 1 TABLET NIGHTLY FOR HIGH CHOLESTEROL Yes KATIE Rojas CNP   doxazosin (CARDURA) 4 MG tablet TAKE ONE-HALF (1/2) TABLET TWICE A DAY FOR HYPERTENSION AND PROSTATE MEDICINE Yes KATIE Rojas CNP   Coenzyme Q10 (CO Q 10) 100 MG CAPS Take 1 tablet by mouth daily.  Yes Historical Provider, MD   gabapentin (NEURONTIN) 600 MG tablet TAKE ONE-HALF

## 2020-02-25 NOTE — PROGRESS NOTES
[facility]) Dermatology  Theresa Andersen MD  [id number]  1960    63 y.o. male     Date of Visit: 2/27/2020    Chief Complaint: [address]    History of Present Illness:  1. Pt is here for treatment of nodular BCC on L medial chest. No concerns since biopsy.     -Pacemaker/ICD: No  -Joint prosthesis: No  -Difficulty with numbing in the past: No  -Local Anesthesia Reaction: No  -Artificial Heart Valve: No  -Organ Transplant: No  -Immunosuppression: No  -Bleeding/Clotting Disorders: No  -Anticoagulant Therapy: No  -Prior problems with wound healing: No    Review of Systems:  Constitutional: Reports general sense of well-being. Past Medical History, Medications and Allergies reviewed.      Past Medical History:   Diagnosis Date    Allergic rhinitis     Chronic back pain     Chronic sinus infection     GERD (gastroesophageal reflux disease)     Hyperlipidemia     Hypertension     IBS (irritable bowel syndrome)     Insomnia     Irritable bladder     RLS (restless legs syndrome) 9/21/2012    Sleep apnea     uses mouthguard    Wears glasses      Past Surgical History:   Procedure Laterality Date    CATARACT REMOVAL WITH IMPLANT Right     COLONOSCOPY  12/2003    normal    COLONOSCOPY  10/24/14    NL Redo 10 yrs    CYST REMOVAL  1995    sebacous cyst head    EYE SURGERY Left 9/16/14    Phaco emulsification with IOL implant    SPINE SURGERY  2001    L5-S1 discectomy    SPINE SURGERY  2010    L4-L5 discectomy    TONSILLECTOMY AND ADENOIDECTOMY  1971       Allergies   Allergen Reactions    Entex [Ami-Selvin] Other (See Comments)     Difficulty urinating  insomnia     Outpatient Medications Marked as Taking for the 2/27/20 encounter (Procedure visit) with Theresa Andersen MD   Medication Sig Dispense Refill    azelastine (ASTELIN) 0.1 % nasal spray 1 spray by Nasal route 2 times daily Use in each nostril as directed 1 Bottle 0    amLODIPine (NORVASC) 10 MG tablet TAKE 1 TABLET DAILY FOR

## 2020-02-27 ENCOUNTER — PROCEDURE VISIT (OUTPATIENT)
Dept: DERMATOLOGY | Age: 60
End: 2020-02-27
Payer: COMMERCIAL

## 2020-02-27 VITALS — WEIGHT: 206 LBS | SYSTOLIC BLOOD PRESSURE: 122 MMHG | BODY MASS INDEX: 26.72 KG/M2 | DIASTOLIC BLOOD PRESSURE: 74 MMHG

## 2020-02-27 PROCEDURE — 12032 INTMD RPR S/A/T/EXT 2.6-7.5: CPT | Performed by: DERMATOLOGY

## 2020-02-27 PROCEDURE — 11602 EXC TR-EXT MAL+MARG 1.1-2 CM: CPT | Performed by: DERMATOLOGY

## 2020-03-02 LAB — DERMATOLOGY PATHOLOGY REPORT: ABNORMAL

## 2020-03-19 ENCOUNTER — TELEPHONE (OUTPATIENT)
Dept: DERMATOLOGY | Age: 60
End: 2020-03-19

## 2020-03-19 NOTE — TELEPHONE ENCOUNTER
Pt c/b #[phone]  Pt stated  Dr. Eleazar Campos removed a basil cell 2-3 wks ago he has about several stitches in and there is one stitch that is bothering him. Pt says it's inflamed and painful. What should he do.   Please c/b to discuss

## 2020-03-19 NOTE — TELEPHONE ENCOUNTER
Spoke with patient, sounds like he has a \"spitting suture\" that is trying to work it's way to the top of the skin. A small red bump in the center if the closure that almost looks like a pimple (per patient). He still has the dermabond glue covering most of the wound. Advised apply vaseline/Aquaphor and cover, can slightly massage area to break down the internal suture. If material protrudes through the surface it may need to be trimmed down.   Patient will try these things to get suture softened and fall off if worsens he will call back

## 2020-05-20 RX ORDER — NORTRIPTYLINE HYDROCHLORIDE 25 MG/1
CAPSULE ORAL
Qty: 270 CAPSULE | Refills: 1 | Status: SHIPPED | OUTPATIENT
Start: 2020-05-20 | End: 2020-07-20 | Stop reason: SINTOL

## 2020-06-03 ENCOUNTER — TELEPHONE (OUTPATIENT)
Dept: INTERNAL MEDICINE CLINIC | Age: 60
End: 2020-06-03

## 2020-06-10 ENCOUNTER — TELEPHONE (OUTPATIENT)
Dept: INTERNAL MEDICINE CLINIC | Age: 60
End: 2020-06-10

## 2020-06-11 NOTE — TELEPHONE ENCOUNTER
Called [facility] gave account information and requested to have results faxed over and was told they will be on there way.  I will scan in and abstract once received and route to you

## 2020-07-11 ENCOUNTER — HOSPITAL ENCOUNTER (OUTPATIENT)
Age: 60
Discharge: HOME | End: 2020-07-11
Payer: COMMERCIAL

## 2020-07-11 LAB
A/G RATIO: 1.7 (ref 1.1–2.2)
ALBUMIN SERPL-MCNC: 4.6 G/DL (ref 3.4–5)
ALP BLD-CCNC: 75 U/L (ref 40–129)
ALT SERPL-CCNC: 24 U/L (ref 10–40)
ANION GAP SERPL CALCULATED.3IONS-SCNC: 10 MMOL/L (ref 3–16)
AST SERPL-CCNC: 25 U/L (ref 15–37)
BILIRUB SERPL-MCNC: 0.5 MG/DL (ref 0–1)
BUN BLDV-MCNC: 21 MG/DL (ref 7–20)
CALCIUM SERPL-MCNC: 9.3 MG/DL (ref 8.3–10.6)
CHLORIDE BLD-SCNC: 107 MMOL/L (ref 99–110)
CHOLESTEROL, TOTAL: 190 MG/DL (ref 0–199)
CO2: 26 MMOL/L (ref 21–32)
CREAT SERPL-MCNC: 1 MG/DL (ref 0.9–1.3)
GFR AFRICAN AMERICAN: >60
GFR NON-AFRICAN AMERICAN: >60
GLOBULIN: 2.7 G/DL
GLUCOSE BLD-MCNC: 96 MG/DL (ref 70–99)
HDLC SERPL-MCNC: 64 MG/DL (ref 40–60)
LDL CHOLESTEROL CALCULATED: 108 MG/DL
POTASSIUM SERPL-SCNC: 4 MMOL/L (ref 3.5–5.1)
PROSTATE SPECIFIC ANTIGEN: 0.8 NG/ML (ref 0–4)
SODIUM BLD-SCNC: 143 MMOL/L (ref 136–145)
TOTAL PROTEIN: 7.3 G/DL (ref 6.4–8.2)
TRIGL SERPL-MCNC: 90 MG/DL (ref 0–150)
VLDLC SERPL CALC-MCNC: 18 MG/DL

## 2020-07-11 PROCEDURE — 80061 LIPID PANEL: CPT

## 2020-07-11 PROCEDURE — 36415 COLL VENOUS BLD VENIPUNCTURE: CPT

## 2020-07-11 PROCEDURE — 84153 ASSAY OF PSA TOTAL: CPT

## 2020-07-11 PROCEDURE — 80053 COMPREHEN METABOLIC PANEL: CPT

## 2020-07-20 ENCOUNTER — VIRTUAL VISIT (OUTPATIENT)
Dept: INTERNAL MEDICINE CLINIC | Age: 60
End: 2020-07-20
Payer: COMMERCIAL

## 2020-07-20 PROCEDURE — 99213 OFFICE O/P EST LOW 20 MIN: CPT | Performed by: INTERNAL MEDICINE

## 2020-07-20 RX ORDER — TRAZODONE HYDROCHLORIDE 50 MG/1
TABLET ORAL
Qty: 30 TABLET | Refills: 1 | Status: SHIPPED | OUTPATIENT
Start: 2020-07-20 | End: 2021-01-18 | Stop reason: ALTCHOICE

## 2020-07-20 RX ORDER — KETOCONAZOLE 20 MG/G
CREAM TOPICAL
Qty: 30 G | Refills: 0 | Status: SHIPPED | OUTPATIENT
Start: 2020-07-20 | End: 2021-01-18 | Stop reason: ALTCHOICE

## 2020-07-20 ASSESSMENT — PATIENT HEALTH QUESTIONNAIRE - PHQ9
SUM OF ALL RESPONSES TO PHQ QUESTIONS 1-9: 0
1. LITTLE INTEREST OR PLEASURE IN DOING THINGS: 0
2. FEELING DOWN, DEPRESSED OR HOPELESS: 0
SUM OF ALL RESPONSES TO PHQ QUESTIONS 1-9: 0
SUM OF ALL RESPONSES TO PHQ9 QUESTIONS 1 & 2: 0

## 2020-07-20 NOTE — PROGRESS NOTES
2020    TELEHEALTH EVALUATION -- Audio/Visual (During AOZTM-56 public health emergency)  CC: Hyperlipidemia  HPI:    Marcela Angulo (:  1960) has requested an audio/video evaluation for the following concern(s):    Patient with HTN, hyperlipidemia, JUSTICE, irritable bladder, IBS, insomnia, GERD, RLS, BPH, ED. Review last office visit with me 20  Reviewed laboratory data 2020: Chemistry panel: BUN: 21.  Creatinine: 1.0. Lipid panel: Total cholesterol: 190. LDL cholesterol: 108. PSA : 0.80. Reviewed office visit 2020 Dr. Rosita Almaguer of dermatology basal cell carcinoma removed. Insomnia: Complains of nortriptyline making him too sleepy the next day. We discussed using trazodone. Review of Systems  Review of Systems   Constitutional: negative   HENT: negative   EYES: negative   Respiratory: negative   Gastrointestinal: negative   Endocrine: negative   Musculoskeletal: negative   Skin: BCC removed by dermatology. Complains of recurrence of his groin rash. Allergic/Immunological: negative   Hematological: negative   Psychiatric/Behavorial: Insomnia with intolerance to nortriptyline  CV: negative   CNS: negative   :Negative   S/E:Negative  Renal: Negative      Prior to Visit Medications    Medication Sig Taking? Authorizing Provider   ketoconazole (NIZORAL) 2 % cream Apply topically daily x 2 weeks. As needed. Yes Santana Wright MD   traZODone (DESYREL) 50 MG tablet 1/2 to 1 pill 1 hour before bedtime for sleep. Yes Santana Wright MD   amLODIPine (NORVASC) 10 MG tablet TAKE 1 TABLET DAILY FOR HYPERTENSION Yes Clint Wright MD   gabapentin (NEURONTIN) 600 MG tablet TAKE ONE-HALF (1/2) TO 1 TABLET AT BEDTIME FOR NERVE PAIN / SLEEP.   UNUSED HALF TABLET SHOULD BE TAKEN AS THE NEXT DOSE Yes Santana Wirght MD   niacin (NIASPAN) 500 MG extended release tablet TAKE 1 TABLET NIGHTLY FOR HIGH CHOLESTEROL Yes KATIE Patel - CNP   doxazosin (CARDURA) 4 MG tablet TAKE ONE-HALF (1/2) TABLET TWICE A DAY Coronavirus Preparedness and Response Supplemental Appropriations Act, this Virtual Visit was conducted with patient's (and/or legal guardian's) consent, to reduce the patient's risk of exposure to COVID-19 and provide necessary medical care. The patient (and/or legal guardian) has also been advised to contact this office for worsening conditions or problems, and seek emergency medical treatment and/or call 911 if deemed necessary. Patient identification was verified at the start of the visit: Yes    Total time spent on this encounter: Not billed by time    Services were provided through a video synchronous discussion virtually to substitute for in-person clinic visit. Patient and provider were located at their individual homes. --Hermelinda Colvin MD on 7/20/2020 at 10:16 PM    An electronic signature was used to authenticate this note.

## 2020-07-26 NOTE — PROGRESS NOTES
cyst  -Reassurance re: benignity   -Edu re: potential recurrent symptoms, intralesional steroid for inflammation, excision as only definitive treatment

## 2020-07-27 ENCOUNTER — OFFICE VISIT (OUTPATIENT)
Dept: DERMATOLOGY | Age: 60
End: 2020-07-27
Payer: COMMERCIAL

## 2020-07-27 VITALS — TEMPERATURE: 97.5 F

## 2020-07-27 PROCEDURE — 99213 OFFICE O/P EST LOW 20 MIN: CPT | Performed by: DERMATOLOGY

## 2020-07-27 PROCEDURE — 17000 DESTRUCT PREMALG LESION: CPT | Performed by: DERMATOLOGY

## 2020-07-27 PROCEDURE — 17003 DESTRUCT PREMALG LES 2-14: CPT | Performed by: DERMATOLOGY

## 2020-09-04 NOTE — TELEPHONE ENCOUNTER
Refill request for niacin, cardura medication.      [facility]    Last visit - 7/20/20     Pending visit - 1/25/21    Last refill -8/15/19

## 2020-09-09 RX ORDER — DOXAZOSIN MESYLATE 4 MG/1
TABLET ORAL
Qty: 90 TABLET | Refills: 3 | Status: SHIPPED | OUTPATIENT
Start: 2020-09-09 | End: 2021-07-27 | Stop reason: SDUPTHER

## 2020-09-09 RX ORDER — NIACIN 500 MG/1
TABLET, EXTENDED RELEASE ORAL
Qty: 90 TABLET | Refills: 3 | Status: SHIPPED | OUTPATIENT
Start: 2020-09-09 | End: 2021-07-26 | Stop reason: SDUPTHER

## 2021-01-16 ENCOUNTER — HOSPITAL ENCOUNTER (OUTPATIENT)
Age: 61
Discharge: HOME | End: 2021-01-16
Payer: COMMERCIAL

## 2021-01-16 DIAGNOSIS — E78.5 ELEVATED LIPIDS: ICD-10-CM

## 2021-01-16 DIAGNOSIS — I10 ESSENTIAL HYPERTENSION: ICD-10-CM

## 2021-01-16 LAB
A/G RATIO: 2.1 (ref 1.1–2.2)
ALBUMIN SERPL-MCNC: 4.7 G/DL (ref 3.4–5)
ALP BLD-CCNC: 70 U/L (ref 40–129)
ALT SERPL-CCNC: 23 U/L (ref 10–40)
ANION GAP SERPL CALCULATED.3IONS-SCNC: 10 MMOL/L (ref 3–16)
AST SERPL-CCNC: 25 U/L (ref 15–37)
BILIRUB SERPL-MCNC: 0.4 MG/DL (ref 0–1)
BUN BLDV-MCNC: 15 MG/DL (ref 7–20)
CALCIUM SERPL-MCNC: 9.2 MG/DL (ref 8.3–10.6)
CHLORIDE BLD-SCNC: 105 MMOL/L (ref 99–110)
CHOLESTEROL, TOTAL: 164 MG/DL (ref 0–199)
CO2: 27 MMOL/L (ref 21–32)
CREAT SERPL-MCNC: 1 MG/DL (ref 0.9–1.3)
GFR AFRICAN AMERICAN: >60
GFR NON-AFRICAN AMERICAN: >60
GLOBULIN: 2.2 G/DL
GLUCOSE BLD-MCNC: 91 MG/DL (ref 70–99)
HDLC SERPL-MCNC: 70 MG/DL (ref 40–60)
LDL CHOLESTEROL CALCULATED: 81 MG/DL
POTASSIUM SERPL-SCNC: 4.2 MMOL/L (ref 3.5–5.1)
SODIUM BLD-SCNC: 142 MMOL/L (ref 136–145)
TOTAL PROTEIN: 6.9 G/DL (ref 6.4–8.2)
TRIGL SERPL-MCNC: 64 MG/DL (ref 0–150)
VLDLC SERPL CALC-MCNC: 13 MG/DL

## 2021-01-16 PROCEDURE — 80061 LIPID PANEL: CPT

## 2021-01-16 PROCEDURE — 80053 COMPREHEN METABOLIC PANEL: CPT

## 2021-01-16 PROCEDURE — 36415 COLL VENOUS BLD VENIPUNCTURE: CPT

## 2021-01-17 NOTE — PATIENT INSTRUCTIONS
Protecting Yourself From the Sun    · Apply an over-the-counter broad spectrum water resistant sunscreen with an SPF of at least 30 to exposed areas of the skin. Dont forget the ears and lips! Remember to reapply sunscreen about every 2 hours and after swimming or sweating. · Wear sun protective clothing. Swim shirts (aka. rash guards) are a great idea and negates the need to reapply sunscreen in those areas. · Seek the shade whenever possible especially between the hours of 10 am and 4 pm when the suns rays are the strongest.     · Avoid tanning beds       Cryosurgery (Freezing) Wound Care Instructions    AFTER THE PROCEDURE:   ? You will notice swelling and redness around the site. This is normal.   ? You may experience a sharp or sore feeling for the next several days. For this discomfort, you may take acetaminophen (Tylenol©). ? A blister may develop at the treated area, sometimes as soon as by the end of the day. After several days, the blister will subside and a scab will form. ? If the area is bumped or traumatized during the first few days following freezing, you may develop bleeding into the blister, forming a blood blister. This is nothing to be alarmed about. ? If the blister is tense, uncomfortable, or much larger than the site that was frozen, you may pop the blister along its edge with a sterile needle (boiled, heated under a flame, or cleaned with alcohol) to allow the fluid to drain out. If the blister does not bother you, no treatment is needed. ? Do NOT peel off the top of the blister roof. It will act as a dressing on top of your wound. WOUND CARE:   ? You may shower or bathe as usual, but avoid scrubbing the areas that have been frozen. ? Cleanse the site twice a day with mild soapy water, and then apply a thin film of white petrolatum (Vaseline©). ? You do not need to cover the area, but can if you prefer. ? Do NOT allow the site to become dry or crusted, or attempt to dry it out with rubbing alcohol or hydrogen peroxide. ? Continue this regimen until the area is pink and healed. Depending on the size and location of your cryosurgery site, healing may take 2 to 4 weeks. ? The area may continue to be pink for several weeks, and over the next few months may become darker or lighter than the surrounding skin. This may be a permanent change.

## 2021-01-17 NOTE — PROGRESS NOTES
[facility]) Dermatology  Peter Vu 54      Maria Esther Comp  1960    62 y.o. male     Date of Visit: 1/18/2021    Last Visit: 6mo    Chief Complaint: Skin check    History of Present Illness:  1. Here for skin check. History of actinic keratoses s/p cryotherapy.   -Wears brimmed hat and rashguards regularly. Does not like to use sunscreen. 2. Hx NMSC - nBCC L medial chest s/p excision 2/2020.     3. Concerned about a raised lesion on R shoulder. No symptoms     Derm History:   -Digital myxoid cyst, R 3rd finger     Review of Systems:  Constitutional: Reports general sense of well-being. Skin: No interval severe sunburns. Allergies: Reviewed and updated. Past Medical History, Surgical History, Medications and Allergies reviewed.      Past Medical History:   Diagnosis Date    Allergic rhinitis     Chronic back pain     Chronic sinus infection     GERD (gastroesophageal reflux disease)     Hyperlipidemia     Hypertension     IBS (irritable bowel syndrome)     Insomnia     Irritable bladder     RLS (restless legs syndrome) 9/21/2012    Sleep apnea     uses mouthguard    Wears glasses      Past Surgical History:   Procedure Laterality Date    CATARACT REMOVAL WITH IMPLANT Right     COLONOSCOPY  12/2003    normal    COLONOSCOPY  10/24/14    NL Redo 10 yrs    CYST REMOVAL  1995    sebacous cyst head    EYE SURGERY Left 9/16/14    Phaco emulsification with IOL implant    SPINE SURGERY  2001    L5-S1 discectomy    SPINE SURGERY  2010    L4-L5 discectomy    TONSILLECTOMY AND ADENOIDECTOMY  1971       Allergies   Allergen Reactions    Entex [Ami-Selvin] Other (See Comments)     Difficulty urinating  insomnia     Outpatient Medications Marked as Taking for the 1/18/21 encounter (Office Visit) with Oli Diaz MD   Medication Sig Dispense Refill    nortriptyline (PAMELOR) 10 MG capsule Take 10 mg by mouth nightly  gabapentin (NEURONTIN) 600 MG tablet Take 1 tablet by mouth every evening for 90 days. TAKE ONE-HALF (1/2) TO 1 TABLET AT BEDTIME FOR NERVE PAIN / SLEEP. UNUSED HALF TABLET SHOULD BE TAKEN AS THE NEXT DOSE 90 tablet 1    amLODIPine (NORVASC) 10 MG tablet TAKE 1 TABLET DAILY FOR HYPERTENSION 90 tablet 1    doxazosin (CARDURA) 4 MG tablet TAKE ONE-HALF (1/2) TABLET TWICE A DAY FOR HYPERTENSION AND PROSTATE MEDICINE 90 tablet 3    niacin (NIASPAN) 500 MG extended release tablet TAKE 1 TABLET NIGHTLY FOR HIGH CHOLESTEROL 90 tablet 3    Coenzyme Q10 (CO Q 10) 100 MG CAPS Take 1 tablet by mouth daily. Social History:     Physical Examination     The following were examined and determined to be normal: Psych/Neuro, Conjunctivae/eyelids, Gums/teeth/lips, Neck, Abdomen, Back, RUE, LUE, RLE, LLE, Nails/digits and Genitalia/groin/buttocks. The following were examined and determined to be abnormal: Scalp/hair, Head/face and Breast/axilla/chest.    -General: Well-appearing, NAD  1. R 3 and L 3 temples, L upper chest 2, crown scalp 2 - ill-defined irregularly-shaped roughly-scaling thin pink macule(s)/papule(s)   2. L medial chest - scar clear  3. R superior shoulder - 8mm dome-shaped yellowish nodule w/ punctum     Assessment and Plan     1. Actinic keratosis(es)  -Edu re: relationship with chronic cumulative sun exposure, low premalignant potential.   -10 lesion(s) treated w/ liquid nitrogen x 2 cycles - R 3 and L 3 temples, L upper chest 2, crown scalp 2 . Edu re: risk of blister formation, discomfort, scar, hypopigmentation. Discussed wound care w/ vaseline or Aquaphor.        2. History of NMSC - clear today  -Reviewed sun protective behavior -- sun avoidance during the peak hours of the day, sun-protective clothing (including hat and sunglasses), OTC sunscreen use (water resistant, broad spectrum, SPF at least 30, need for reapplication every 2 to 3 hours), avoidance of tanning beds -Full skin exam in 1 year (sooner if indicated)      3. Epidermoid cyst  -Reassurance re: benignity   -Edu re: potential recurrent symptoms, intralesional steroid for inflammation, excision as only definitive treatment

## 2021-01-18 ENCOUNTER — OFFICE VISIT (OUTPATIENT)
Dept: DERMATOLOGY | Age: 61
End: 2021-01-18
Payer: COMMERCIAL

## 2021-01-18 VITALS — TEMPERATURE: 97.5 F

## 2021-01-18 DIAGNOSIS — Z85.828 HISTORY OF NONMELANOMA SKIN CANCER: ICD-10-CM

## 2021-01-18 DIAGNOSIS — Z12.83 SCREENING EXAM FOR SKIN CANCER: ICD-10-CM

## 2021-01-18 DIAGNOSIS — L72.0 EPIDERMOID CYST: ICD-10-CM

## 2021-01-18 DIAGNOSIS — L57.0 ACTINIC KERATOSES: Primary | ICD-10-CM

## 2021-01-18 PROCEDURE — 17000 DESTRUCT PREMALG LESION: CPT | Performed by: DERMATOLOGY

## 2021-01-18 PROCEDURE — 99213 OFFICE O/P EST LOW 20 MIN: CPT | Performed by: DERMATOLOGY

## 2021-01-18 PROCEDURE — 17003 DESTRUCT PREMALG LES 2-14: CPT | Performed by: DERMATOLOGY

## 2021-01-18 RX ORDER — NORTRIPTYLINE HYDROCHLORIDE 10 MG/1
10 CAPSULE ORAL NIGHTLY
COMMUNITY
End: 2021-07-26

## 2021-01-25 ENCOUNTER — OFFICE VISIT (OUTPATIENT)
Dept: INTERNAL MEDICINE CLINIC | Age: 61
End: 2021-01-25
Payer: COMMERCIAL

## 2021-01-25 VITALS
WEIGHT: 218 LBS | HEIGHT: 74 IN | DIASTOLIC BLOOD PRESSURE: 80 MMHG | SYSTOLIC BLOOD PRESSURE: 130 MMHG | OXYGEN SATURATION: 96 % | TEMPERATURE: 97.3 F | BODY MASS INDEX: 27.98 KG/M2 | HEART RATE: 75 BPM

## 2021-01-25 DIAGNOSIS — G47.00 INSOMNIA, UNSPECIFIED TYPE: ICD-10-CM

## 2021-01-25 DIAGNOSIS — M67.471 GANGLION CYST OF RIGHT FOOT: ICD-10-CM

## 2021-01-25 DIAGNOSIS — I10 ESSENTIAL HYPERTENSION: Primary | ICD-10-CM

## 2021-01-25 DIAGNOSIS — Z12.6 SCREENING FOR MALIGNANT NEOPLASM OF BLADDER: ICD-10-CM

## 2021-01-25 DIAGNOSIS — K58.9 IRRITABLE BOWEL SYNDROME, UNSPECIFIED TYPE: ICD-10-CM

## 2021-01-25 PROCEDURE — 99213 OFFICE O/P EST LOW 20 MIN: CPT | Performed by: INTERNAL MEDICINE

## 2021-01-25 ASSESSMENT — PATIENT HEALTH QUESTIONNAIRE - PHQ9
2. FEELING DOWN, DEPRESSED OR HOPELESS: 0
SUM OF ALL RESPONSES TO PHQ QUESTIONS 1-9: 0
SUM OF ALL RESPONSES TO PHQ QUESTIONS 1-9: 0

## 2021-01-31 NOTE — PROGRESS NOTES
Libby Marina (:  1960) is a 62 y.o. male,Established patient, here for evaluation of the following chief complaint(s):  Hypertension (medication review) and Hyperlipidemia      ASSESSMENT/PLAN:  1. Essential hypertension  -   Stable. Continue present treatment. Psa screening; Future  -     Comprehensive Metabolic Panel; Future    2. Insomnia, unspecified type          Stable. Continue present treatment    3. Irritable bowel syndrome, unspecified type           Baseline. Continue present treatment    4. Screening for malignant neoplasm of bladder  -   Health maintenance: Psa screening; Future    5. Ganglion cyst of right foot  -Referral for evaluation and treatment:   Amb External Referral To Podiatry      Return in about 6 months (around 2021) for HTN. SUBJECTIVE/OBJECTIVE:  HPI  Patient with HTN, hyperlipidemia, JUSTICE, insomnia, GERD, IBS, RLS, BPH, ED. Review last office visit with me: 2020: V V. Insomnia. Complains of insomnia makes him too sleepy the next morning. Instead trial of trazodone prescribed. Reviewed laboratory data 2021: Chemistry panel: Unremarkable. Lipid panel: Total cholesterol: 164. LDL cholesterol 81. Reviewed office visit: 2021: Dr. Sean Blanchard of dermatology. Skin check. DX actinic keratosis, history of nonmelanoma week skin cancers. Epidermoid cyst.  Health maintenance: Shingles. Review of Systems    Review of Systems   Constitutional: negative   HENT: negative   EYES: negative   Respiratory: negative   Gastrointestinal: negative   Endocrine: negative   Musculoskeletal: Complains about a lump left foot arch. Skin: Recent skin check with dermatology as mentioned above.   Allergic/Immunological: negative   Hematological: negative   Psychiatric/Behavorial: negative   CV: negative   CNS: negative   :Negative   S/E:Negative  Renal: Negative      Physical Exam Head/neck: Ears: Normal TM. No obstruction. Throat: Not examined. Mask. Neck: No lymphadenopathy. Eyes: EOMI, PERRLA with no nystagmus  Lungs: Clear to auscultation. CV: S1-S2 normal.   RODERICK murmur. Carotid: No bruit. Abdominal Examination: Bowel sounds present. Soft nontender. No mass no guarding or   rebound. Spine/extremities: Left foot. Mid arch. Subcutaneous probable cyst.  Not tender to touch. No discoloration. No edema. No tenderness to palpation. Skin: No rash  CNS: Patient is alert, cooperative, moves all 4 limbs, ambulates without difficulty, light touch normal.    Good orientation. Blood pressure 130/80, pulse 75, temperature 97.3 °F (36.3 °C), temperature source Infrared, height 6' 1.5\" (1.867 m), weight 218 lb (98.9 kg), SpO2 96 %. An electronic signature was used to authenticate this note.     --Nanda Morton MD

## 2021-02-17 ENCOUNTER — HOSPITAL ENCOUNTER (EMERGENCY)
Age: 61
Discharge: HOME | End: 2021-02-17
Attending: EMERGENCY MEDICINE
Payer: COMMERCIAL

## 2021-02-17 VITALS
SYSTOLIC BLOOD PRESSURE: 150 MMHG | BODY MASS INDEX: 27.17 KG/M2 | HEART RATE: 100 BPM | RESPIRATION RATE: 21 BRPM | TEMPERATURE: 99.8 F | DIASTOLIC BLOOD PRESSURE: 80 MMHG | WEIGHT: 205 LBS | OXYGEN SATURATION: 95 % | HEIGHT: 73 IN

## 2021-02-17 DIAGNOSIS — U07.1 COVID-19 VIRUS INFECTION: ICD-10-CM

## 2021-02-17 DIAGNOSIS — R53.81 MALAISE: Primary | ICD-10-CM

## 2021-02-17 DIAGNOSIS — R63.0 ANOREXIA: ICD-10-CM

## 2021-02-17 LAB
A/G RATIO: 1.1 (ref 1.1–2.2)
ALBUMIN SERPL-MCNC: 3.7 G/DL (ref 3.4–5)
ALP BLD-CCNC: 87 U/L (ref 40–129)
ALT SERPL-CCNC: 96 U/L (ref 10–40)
ANION GAP SERPL CALCULATED.3IONS-SCNC: 11 MMOL/L (ref 3–16)
AST SERPL-CCNC: 107 U/L (ref 15–37)
BASOPHILS ABSOLUTE: 0 K/UL (ref 0–0.2)
BASOPHILS RELATIVE PERCENT: 0.2 %
BILIRUB SERPL-MCNC: 0.4 MG/DL (ref 0–1)
BUN BLDV-MCNC: 19 MG/DL (ref 7–20)
CALCIUM SERPL-MCNC: 9.1 MG/DL (ref 8.3–10.6)
CHLORIDE BLD-SCNC: 104 MMOL/L (ref 99–110)
CO2: 24 MMOL/L (ref 21–32)
CREAT SERPL-MCNC: 1.2 MG/DL (ref 0.9–1.3)
EOSINOPHILS ABSOLUTE: 0 K/UL (ref 0–0.6)
EOSINOPHILS RELATIVE PERCENT: 0 %
GFR AFRICAN AMERICAN: >60
GFR NON-AFRICAN AMERICAN: >60
GLOBULIN: 3.4 G/DL
GLUCOSE BLD-MCNC: 123 MG/DL (ref 70–99)
HCT VFR BLD CALC: 39 % (ref 40.5–52.5)
HEMOGLOBIN: 13 G/DL (ref 13.5–17.5)
LYMPHOCYTES ABSOLUTE: 0.8 K/UL (ref 1–5.1)
LYMPHOCYTES RELATIVE PERCENT: 12.3 %
MCH RBC QN AUTO: 29.8 PG (ref 26–34)
MCHC RBC AUTO-ENTMCNC: 33.4 G/DL (ref 31–36)
MCV RBC AUTO: 89.3 FL (ref 80–100)
MONOCYTES ABSOLUTE: 0.9 K/UL (ref 0–1.3)
MONOCYTES RELATIVE PERCENT: 12.8 %
NEUTROPHILS ABSOLUTE: 5 K/UL (ref 1.7–7.7)
NEUTROPHILS RELATIVE PERCENT: 74.7 %
PDW BLD-RTO: 13.7 % (ref 12.4–15.4)
PLATELET # BLD: 196 K/UL (ref 135–450)
PMV BLD AUTO: 7.1 FL (ref 5–10.5)
POTASSIUM SERPL-SCNC: 4.3 MMOL/L (ref 3.5–5.1)
RBC # BLD: 4.37 M/UL (ref 4.2–5.9)
SODIUM BLD-SCNC: 139 MMOL/L (ref 136–145)
TOTAL PROTEIN: 7.1 G/DL (ref 6.4–8.2)
WBC # BLD: 6.7 K/UL (ref 4–11)

## 2021-02-17 PROCEDURE — 80053 COMPREHEN METABOLIC PANEL: CPT

## 2021-02-17 PROCEDURE — 2580000003 HC RX 258: Performed by: EMERGENCY MEDICINE

## 2021-02-17 PROCEDURE — 85025 COMPLETE CBC W/AUTO DIFF WBC: CPT

## 2021-02-17 PROCEDURE — 99285 EMERGENCY DEPT VISIT HI MDM: CPT

## 2021-02-17 RX ORDER — 0.9 % SODIUM CHLORIDE 0.9 %
1000 INTRAVENOUS SOLUTION INTRAVENOUS ONCE
Status: COMPLETED | OUTPATIENT
Start: 2021-02-17 | End: 2021-02-17

## 2021-02-17 RX ADMIN — SODIUM CHLORIDE 1000 ML: 9 INJECTION, SOLUTION INTRAVENOUS at 22:24

## 2021-02-17 ASSESSMENT — ENCOUNTER SYMPTOMS
DIARRHEA: 0
ABDOMINAL PAIN: 0
NAUSEA: 0
RHINORRHEA: 0
VOMITING: 0
SHORTNESS OF BREATH: 0

## 2021-02-17 ASSESSMENT — PAIN SCALES - GENERAL: PAINLEVEL_OUTOF10: 3

## 2021-02-17 ASSESSMENT — PAIN DESCRIPTION - LOCATION: LOCATION: GENERALIZED

## 2021-02-18 ENCOUNTER — CARE COORDINATION (OUTPATIENT)
Dept: CARE COORDINATION | Age: 61
End: 2021-02-18

## 2021-02-18 NOTE — CARE COORDINATION
ACM attempted contacting Ania Anguol x 3  to follow up on [facility] ED visit on 2/17/2021 dx:Malaise Anorexia COVID-19 virus infection  AVS:     Follow up with Yandy Brothers MD (Internal Medicine) in 1 day (2/18/2021)    No further outreach at this time.

## 2021-02-18 NOTE — ED PROVIDER NOTES
[facility]  ED  eMERGENCY dEPARTMENTeNCOUnter      Pt Name: Tae Bolanos  MRN: [id number]  [place] 1960  Date of evaluation: 2/17/2021  Provider: Cristina Villanueva MD    CHIEF COMPLAINT       Chief Complaint   Patient presents with    Positive For Covid-19     states on day 8 of covid.  Dehydration     states dehyrdated         HISTORY OF PRESENT ILLNESS   (Location/Symptom, Timing/Onset,Context/Setting, Quality, Duration, Modifying Factors, Severity)  Note limiting factors. Tae Bolanos is a 62 y.o. male who presents to the emergency department for malaise and dehydration. The patient states that he has been COVID-19 positive for approximately a week to week and a half. He has poor appetite due to poor taste and therefore he has not been eating and barely drinking so he thinks that he is dehydrated and came in for evaluation. He has no shortness of breath no chest pain he has not had any nausea or vomiting no diarrhea no urinary complaints. Nursing notes were reviewed. REVIEW OF SYSTEMS    (2-9 systems for level 4, 10 or more for level 5)     Review of Systems   Constitutional: Positive for appetite change and fatigue. HENT: Negative for rhinorrhea. Eyes: Negative for visual disturbance. Respiratory: Negative for shortness of breath. Cardiovascular: Negative for chest pain. Gastrointestinal: Negative for abdominal pain, diarrhea, nausea and vomiting. Endocrine: Negative for polyuria. Genitourinary: Negative for dysuria. Musculoskeletal: Positive for myalgias. Skin: Negative for rash. Neurological: Positive for headaches. Hematological: Does not bruise/bleed easily. Positive and pertinent negative as per HPI. Except as noted above in the ROS, all other systems were reviewed and were negative.     PAST MEDICAL HISTORY     Past Medical History:   Diagnosis Date    Allergic rhinitis     Chronic back pain     Chronic sinus infection  GERD (gastroesophageal reflux disease)     Hyperlipidemia     Hypertension     IBS (irritable bowel syndrome)     Insomnia     Irritable bladder     RLS (restless legs syndrome) 9/21/2012    Sleep apnea     uses mouthguard    Wears glasses          SURGICALHISTORY       Past Surgical History:   Procedure Laterality Date    CATARACT REMOVAL WITH IMPLANT Right     COLONOSCOPY  12/2003    normal    COLONOSCOPY  10/24/14    NL Redo 10 yrs    CYST REMOVAL  1995    sebacous cyst head    EYE SURGERY Left 9/16/14    Phaco emulsification with IOL implant    SPINE SURGERY  2001    L5-S1 discectomy    SPINE SURGERY  2010    L4-L5 discectomy    TONSILLECTOMY AND ADENOIDECTOMY  1971         CURRENT MEDICATIONS       Discharge Medication List as of 2/17/2021 11:23 PM      CONTINUE these medications which have NOT CHANGED    Details   nortriptyline (PAMELOR) 10 MG capsule Take 10 mg by mouth nightlyHistorical Med      gabapentin (NEURONTIN) 600 MG tablet Take 1 tablet by mouth every evening for 90 days. TAKE ONE-HALF (1/2) TO 1 TABLET AT BEDTIME FOR NERVE PAIN / SLEEP. UNUSED HALF TABLET SHOULD BE TAKEN AS THE NEXT DOSE, Disp-90 tablet, R-1Normal      amLODIPine (NORVASC) 10 MG tablet TAKE 1 TABLET DAILY FOR HYPERTENSION, Disp-90 tablet, R-1Normal      doxazosin (CARDURA) 4 MG tablet TAKE ONE-HALF (1/2) TABLET TWICE A DAY FOR HYPERTENSION AND PROSTATE MEDICINE, Disp-90 tablet, R-3Normal      niacin (NIASPAN) 500 MG extended release tablet TAKE 1 TABLET NIGHTLY FOR HIGH CHOLESTEROL, Disp-90 tablet, R-3Normal      Coenzyme Q10 (CO Q 10) 100 MG CAPS Take 1 tablet by mouth daily.              ALLERGIES     Entex [ami-melba]    FAMILY HISTORY       Family History   Problem Relation Age of Onset    Cancer Mother         breast    High Blood Pressure Mother     Thyroid Disease Mother     Heart Disease Father 72        PTCA / CAD    Diabetes Father     Kidney Disease Father         kidney stones  High Cholesterol Father     Other Father         endartectomy    Cancer Sister         skin cancer          SOCIAL HISTORY       Social History     Socioeconomic History    Marital status:      Spouse name: None    Number of children: None    Years of education: None    Highest education level: None   Occupational History    None   Social Needs    Financial resource strain: None    Food insecurity     Worry: None     Inability: None    Transportation needs     Medical: None     Non-medical: None   Tobacco Use    Smoking status: Never Smoker    Smokeless tobacco: Never Used   Substance and Sexual Activity    Alcohol use: Yes     Comment: once a week    Drug use: No    Sexual activity: Yes     Partners: Female   Lifestyle    Physical activity     Days per week: None     Minutes per session: None    Stress: None   Relationships    Social connections     Talks on phone: None     Gets together: None     Attends Yarsani service: None     Active member of club or organization: None     Attends meetings of clubs or organizations: None     Relationship status: None    Intimate partner violence     Fear of current or ex partner: None     Emotionally abused: None     Physically abused: None     Forced sexual activity: None   Other Topics Concern    None   Social History Narrative    None       SCREENINGS    [place] Coma Scale  Eye Opening: Spontaneous  Best Verbal Response: Oriented  Best Motor Response: Obeys commands  [place] Coma Scale Score: 15        PHYSICAL EXAM    (up to 7 for level 4, 8 or more for level 5)     ED Triage Vitals [02/17/21 2110]   BP Temp Temp Source Pulse Resp SpO2 Height Weight   (!) 163/81 99.8 °F (37.7 °C) Oral 110 16 100 % 6' 1\" (1.854 m) 205 lb (93 kg)       Physical Exam  Vitals signs and nursing note reviewed. Constitutional:       Appearance: Normal appearance. He is well-developed. He is ill-appearing. He is not toxic-appearing.    HENT: Head: Normocephalic and atraumatic. Right Ear: External ear normal.      Left Ear: External ear normal.      Nose: Nose normal.   Eyes:      General: No scleral icterus. Right eye: No discharge. Left eye: No discharge. Conjunctiva/sclera: Conjunctivae normal.   Neck:      Musculoskeletal: Neck supple. Cardiovascular:      Rate and Rhythm: Normal rate and regular rhythm. Heart sounds: Normal heart sounds. Pulmonary:      Effort: Pulmonary effort is normal. No respiratory distress. Breath sounds: Normal breath sounds. No wheezing or rales. Abdominal:      General: Bowel sounds are normal. There is no distension. Palpations: Abdomen is soft. Tenderness: There is no abdominal tenderness. There is no guarding or rebound. Skin:     Coloration: Skin is not pale. Neurological:      Mental Status: He is alert.    Psychiatric:         Mood and Affect: Mood normal.         Behavior: Behavior normal.           DIAGNOSTIC RESULTS     EKG: All EKG's are interpreted by the Emergency Department Physician who either signs or Co-signs this chart in the absence of a cardiologist.    12 lead EKG shows     RADIOLOGY:   Non-plain film images such as CT, Ultrasound and MRI are read by the radiologist. Plain radiographic images are visualized and preliminarily interpreted by the emergency physician with the below findings:        Interpretation per the Radiologist below, if available at the time of this note:    No orders to display         ED BEDSIDE ULTRASOUND:   Performed by ED Physician - none    LABS:  Labs Reviewed   CBC WITH AUTO DIFFERENTIAL - Abnormal; Notable for the following components:       Result Value    Hemoglobin 13.0 (*)     Hematocrit 39.0 (*)     Lymphocytes Absolute 0.8 (*)     All other components within normal limits    Narrative:     Performed at:  [facility]) - [address]   Phone [phone] COMPREHENSIVE METABOLIC PANEL - Abnormal; Notable for the following components:    Glucose 123 (*)     ALT 96 (*)      (*)     All other components within normal limits    Narrative:     Performed at:  [facility]) [address], [address] [place] [address] [place]   Phone [phone]       All other labs were within normal range or not returned as of this dictation. EMERGENCY DEPARTMENT COURSE and DIFFERENTIAL DIAGNOSIS/MDM:   Vitals:    Vitals:    02/17/21 2110 02/17/21 2130 02/17/21 2228 02/17/21 2319   BP: (!) 163/81  (!) 149/80 (!) 150/80   Pulse: 110 102 107 100   Resp: 16  12 21   Temp: 99.8 °F (37.7 °C)      TempSrc: Oral      SpO2: 100%  97% 95%   Weight: 205 lb (93 kg)      Height: 6' 1\" (1.854 m)          Adult male who comes in with complaints of dehydration poor appetite and fatigue who is positive for COVID-19. Basic laboratory studies are ordered. Patient is placed on cardiac monitoring because he is initially tachycardic. Cardiac monitor is interpreted myself shows sinus tachycardia. Patient has elevated ALT and AST likely secondary to COVID-19 otherwise no significant laboratory abnormalities. The patient is given a liter bolus of normal saline and his tachycardia has improved. I discussed with the patient that with improved vital signs and no acute abnormality of labs requiring admission that he will be discharged home I recommend that even if he has poor appetite that he tries to drink and tries to not rush his body with soups. He does have a primary care provider recommended he follows up. Return instructions discussed. Patient expressed understanding he is agreeable to treatment plan. Return instructions discussed. CRITICAL CARE TIME   None       CONSULTS:  None    PROCEDURES:  Unless otherwise noted above, none     Procedures    FINAL IMPRESSION      1. Malaise    2. Anorexia    3.  COVID-19 virus infection DISPOSITION/PLAN   DISPOSITION Decision To Discharge 02/17/2021 11:23:05 PM      PATIENT REFERREDTO:  Leia Wright MD  [address] [place] [address]    In 1 day        DISCHARGEMEDICATIONS:  Discharge Medication List as of 2/17/2021 11:23 PM             (Please note that portions of this note were completed with a voice recognition program.  Efforts were made to edit the dictations but occasionally words are mis-transcribed.)    Adriana Lopez MD (electronically signed)  Attending Emergency Physician        Adriana Lopez MD  02/18/21 [id number]

## 2021-03-04 ENCOUNTER — TELEPHONE (OUTPATIENT)
Dept: INTERNAL MEDICINE CLINIC | Age: 61
End: 2021-03-04

## 2021-03-04 NOTE — TELEPHONE ENCOUNTER
Patient tested positive for covid 4 weeks ago and ever since then his resting heart rate has been between 100 and 110 he would like to know if he should be concerned or what you recommend?

## 2021-03-04 NOTE — TELEPHONE ENCOUNTER
Please call patient. If he has no problem with chest pain or shortness of breath I would not be concerned.   Call for change in condition  Dr. Barahona Gene

## 2021-04-05 DIAGNOSIS — G47.00 INSOMNIA, UNSPECIFIED TYPE: ICD-10-CM

## 2021-04-05 RX ORDER — NORTRIPTYLINE HYDROCHLORIDE 25 MG/1
CAPSULE ORAL
Qty: 270 CAPSULE | Refills: 1 | OUTPATIENT
Start: 2021-04-05

## 2021-04-05 NOTE — TELEPHONE ENCOUNTER
Refill request for nortriptyline medication.      [facility]      Last visit - 1/25/21     Pending visit - 7/26/21    Last refill -7/20/20

## 2021-04-12 ENCOUNTER — TELEPHONE (OUTPATIENT)
Dept: INTERNAL MEDICINE CLINIC | Age: 61
End: 2021-04-12

## 2021-04-12 DIAGNOSIS — I10 ESSENTIAL HYPERTENSION: ICD-10-CM

## 2021-04-12 DIAGNOSIS — G47.00 INSOMNIA, UNSPECIFIED TYPE: ICD-10-CM

## 2021-04-12 DIAGNOSIS — G47.00 INSOMNIA, UNSPECIFIED TYPE: Primary | ICD-10-CM

## 2021-04-12 RX ORDER — NORTRIPTYLINE HYDROCHLORIDE 25 MG/1
CAPSULE ORAL
Qty: 270 CAPSULE | Refills: 1 | Status: CANCELLED | OUTPATIENT
Start: 2021-04-12

## 2021-04-12 RX ORDER — NORTRIPTYLINE HYDROCHLORIDE 10 MG/1
10 CAPSULE ORAL NIGHTLY
Qty: 30 CAPSULE | OUTPATIENT
Start: 2021-04-12

## 2021-04-12 RX ORDER — NORTRIPTYLINE HYDROCHLORIDE 25 MG/1
CAPSULE ORAL
Qty: 270 CAPSULE | Refills: 1 | Status: SHIPPED | OUTPATIENT
Start: 2021-04-12 | End: 2022-07-11 | Stop reason: SDUPTHER

## 2021-07-20 ENCOUNTER — HOSPITAL ENCOUNTER (OUTPATIENT)
Age: 61
Discharge: HOME | End: 2021-07-20
Payer: COMMERCIAL

## 2021-07-20 DIAGNOSIS — Z12.6 SCREENING FOR MALIGNANT NEOPLASM OF BLADDER: ICD-10-CM

## 2021-07-20 DIAGNOSIS — I10 ESSENTIAL HYPERTENSION: ICD-10-CM

## 2021-07-20 LAB
A/G RATIO: 1.6 (ref 1.1–2.2)
ALBUMIN SERPL-MCNC: 4.4 G/DL (ref 3.4–5)
ALP BLD-CCNC: 79 U/L (ref 40–129)
ALT SERPL-CCNC: 28 U/L (ref 10–40)
ANION GAP SERPL CALCULATED.3IONS-SCNC: 13 MMOL/L (ref 3–16)
AST SERPL-CCNC: 31 U/L (ref 15–37)
BILIRUB SERPL-MCNC: 0.3 MG/DL (ref 0–1)
BUN BLDV-MCNC: 16 MG/DL (ref 7–20)
CALCIUM SERPL-MCNC: 9 MG/DL (ref 8.3–10.6)
CHLORIDE BLD-SCNC: 107 MMOL/L (ref 99–110)
CO2: 23 MMOL/L (ref 21–32)
CREAT SERPL-MCNC: 1 MG/DL (ref 0.8–1.3)
GFR AFRICAN AMERICAN: >60
GFR NON-AFRICAN AMERICAN: >60
GLOBULIN: 2.8 G/DL
GLUCOSE BLD-MCNC: 94 MG/DL (ref 70–99)
POTASSIUM SERPL-SCNC: 4.3 MMOL/L (ref 3.5–5.1)
PROSTATE SPECIFIC ANTIGEN: 0.7 NG/ML (ref 0–4)
SODIUM BLD-SCNC: 143 MMOL/L (ref 136–145)
TOTAL PROTEIN: 7.2 G/DL (ref 6.4–8.2)

## 2021-07-20 PROCEDURE — 84153 ASSAY OF PSA TOTAL: CPT

## 2021-07-20 PROCEDURE — 80053 COMPREHEN METABOLIC PANEL: CPT

## 2021-07-20 PROCEDURE — 36415 COLL VENOUS BLD VENIPUNCTURE: CPT

## 2021-07-26 ENCOUNTER — OFFICE VISIT (OUTPATIENT)
Dept: INTERNAL MEDICINE CLINIC | Age: 61
End: 2021-07-26
Payer: COMMERCIAL

## 2021-07-26 DIAGNOSIS — Z23 NEED FOR SHINGLES VACCINE: ICD-10-CM

## 2021-07-26 DIAGNOSIS — G47.33 OBSTRUCTIVE SLEEP APNEA SYNDROME: ICD-10-CM

## 2021-07-26 DIAGNOSIS — G47.00 INSOMNIA, UNSPECIFIED TYPE: ICD-10-CM

## 2021-07-26 DIAGNOSIS — G25.81 RLS (RESTLESS LEGS SYNDROME): ICD-10-CM

## 2021-07-26 DIAGNOSIS — I10 ESSENTIAL HYPERTENSION: Primary | ICD-10-CM

## 2021-07-26 DIAGNOSIS — K58.9 IRRITABLE BOWEL SYNDROME, UNSPECIFIED TYPE: ICD-10-CM

## 2021-07-26 DIAGNOSIS — K21.9 GASTROESOPHAGEAL REFLUX DISEASE WITHOUT ESOPHAGITIS: ICD-10-CM

## 2021-07-26 DIAGNOSIS — E78.5 ELEVATED LIPIDS: ICD-10-CM

## 2021-07-26 DIAGNOSIS — M25.552 ACUTE PAIN OF LEFT HIP: ICD-10-CM

## 2021-07-26 DIAGNOSIS — N32.89 IRRITABLE BLADDER: ICD-10-CM

## 2021-07-26 PROCEDURE — 90471 IMMUNIZATION ADMIN: CPT | Performed by: INTERNAL MEDICINE

## 2021-07-26 PROCEDURE — 99214 OFFICE O/P EST MOD 30 MIN: CPT | Performed by: INTERNAL MEDICINE

## 2021-07-26 PROCEDURE — 90750 HZV VACC RECOMBINANT IM: CPT | Performed by: INTERNAL MEDICINE

## 2021-07-26 RX ORDER — NIACIN 500 MG/1
TABLET, EXTENDED RELEASE ORAL
Qty: 90 TABLET | Refills: 1 | Status: SHIPPED | OUTPATIENT
Start: 2021-07-26 | End: 2022-02-26 | Stop reason: SDUPTHER

## 2021-07-26 RX ORDER — GABAPENTIN 600 MG/1
TABLET ORAL
Qty: 90 TABLET | Refills: 1 | Status: SHIPPED | OUTPATIENT
Start: 2021-07-26 | End: 2022-02-26 | Stop reason: SDUPTHER

## 2021-07-26 NOTE — PROGRESS NOTES
Avelino Jacinto 62 y.o. male presents today for an Established patient for   Chief Complaint   Patient presents with    Hypertension            ASSESSMENT/PLAN    1. Essential hypertension              Stable. Continue present treatment  - Comprehensive Metabolic Panel; Future    2. Elevated lipids                          Continue to monitor.  - niacin (NIASPAN) 500 MG extended release tablet; TAKE 1 TABLET NIGHTLY FOR HIGH CHOLESTEROL  Dispense: 90 tablet; Refill: 1    3. Obstructive sleep apnea syndrome                 Stable. 4. Irritable bladder                Stable. 5. Gastroesophageal reflux disease without esophagitis                    Baseline. 6. RLS (restless legs syndrome)                           Doing well with Neurontin  - gabapentin (NEURONTIN) 600 MG tablet; TAKE ONE-HALF (1/2) TABLET TO 1 TABLET AT BEDTIME FOR NERVE PAIN/ SLEEP. UNUSED HALF TABLET SHOULD BE TAKEN AS NEXT DOSE  Dispense: 90 tablet; Refill: 1    7. Irritable bowel syndrome, unspecified type                   Stable. 8. Insomnia, unspecified type                  Doing well. 9. Need for shingles vaccine                  Health maintenance  - Zoster recombinant ([address] [place] [address] [address])    10. Acute pain of left hip                      Referral to orthopedics  - [address] [place], [place] [address]Kyree MD, Orthopedic Surgery, [facility]     11. Patient's biometric form completed. Return in about 6 months (around 1/26/2022) for HTN    LIPIDS    . HPI: Patient with HTN, hyperlipidemia, JUSTICE, irritable bladder, insomnia, GERD, irritable bowel syndrome, ROS. Review last office visit with me: 1/27/2021. Referred to podiatry. Review laboratory data 7/20/2021. Chemistry panel normal with glucose 94. PSA: 0.70. Health maintenance: Covid vaccine, shingles. Emergency room visit: 2/17/2021. Positive for Covid. Dehydrated. Malaise. Decreased taste. Decreased appetite and p.o. intake.   Patient with a 3 to 4 months left groin and buttocks pain with twisting. Declines orthopedic evaluation. Patient has biometric form to be completed. Results for orders placed or performed during the hospital encounter of 07/20/21   Comprehensive Metabolic Panel   Result Value Ref Range    Sodium 143 136 - 145 mmol/L    Potassium 4.3 3.5 - 5.1 mmol/L    Chloride 107 99 - 110 mmol/L    CO2 23 21 - 32 mmol/L    Anion Gap 13 3 - 16    Glucose 94 70 - 99 mg/dL    BUN 16 7 - 20 mg/dL    CREATININE 1.0 0.8 - 1.3 mg/dL    GFR Non-African American >60 >60    GFR African American >60 >60    Calcium 9.0 8.3 - 10.6 mg/dL    Total Protein 7.2 6.4 - 8.2 g/dL    Albumin 4.4 3.4 - 5.0 g/dL    Albumin/Globulin Ratio 1.6 1.1 - 2.2    Total Bilirubin 0.3 0.0 - 1.0 mg/dL    Alkaline Phosphatase 79 40 - 129 U/L    ALT 28 10 - 40 U/L    AST 31 15 - 37 U/L    Globulin 2.8 g/dL   Psa screening   Result Value Ref Range    PSA 0.70 0.00 - 4.00 ng/mL              ROS:  Review of Systems   Constitutional: Residual Covid symptoms  HENT: negative   EYES: negative   Respiratory: negative   Gastrointestinal: negative   Endocrine: negative   Musculoskeletal: Left groin and buttocks pain. Skin: negative   Allergic/Immunological: negative   Hematological: negative   Psychiatric/Behavorial: negative   CV: negative   CNS: negative   :Negative   S/E:Negative  Renal: Negative     Physical Exam: 126/64 by me. Head/neck: Ears: Normal TM. No obstruction. Throat: Mask. Not examined. Neck thyroid is nonpalpable. Neck: No lymphadenopathy. Eyes: EOMI, PERRLA with no nystagmus  Lungs: Clear to auscultation. CV: S1-S2 normal.   RODERICK murmur. Carotid: No bruit. Abdominal Examination: Bowel sounds present. Soft nontender. No mass no guarding or   rebound. Spine/extremities: Decreased deep tendon reflex about the left knee. He is able to point toes against resistance. Light touch is normal.  Positive straight leg raising test on the left at 90 degrees no edema. No tenderness to palpation. Skin: No rash  CNS: Patient is alert, cooperative, moves all 4 limbs, ambulates without difficulty, light touch normal.   Good historian. Good orientation. Blood pressure 126/64, pulse 68, temperature 98.1 °F (36.7 °C), temperature source Infrared, height 6' 1\" (1.854 m), weight 222 lb 1.6 oz (100.7 kg), SpO2 97 %.            Rojelio Owens MD

## 2021-07-27 DIAGNOSIS — I10 ESSENTIAL HYPERTENSION: ICD-10-CM

## 2021-07-27 RX ORDER — DOXAZOSIN MESYLATE 4 MG/1
TABLET ORAL
Qty: 90 TABLET | Refills: 1 | Status: SHIPPED | OUTPATIENT
Start: 2021-07-27 | End: 2022-02-26 | Stop reason: SDUPTHER

## 2021-07-27 NOTE — TELEPHONE ENCOUNTER
Refill request for Doxazosin 4 mg  medication.      Name of [facility]- [facility]      Last visit - 7/26/21     Pending visit - 12/27/21    Last refill -9/9/20

## 2021-07-31 VITALS
SYSTOLIC BLOOD PRESSURE: 126 MMHG | HEART RATE: 68 BPM | TEMPERATURE: 98.1 F | WEIGHT: 222.1 LBS | BODY MASS INDEX: 29.43 KG/M2 | HEIGHT: 73 IN | DIASTOLIC BLOOD PRESSURE: 64 MMHG | OXYGEN SATURATION: 97 %

## 2021-10-12 ENCOUNTER — NURSE ONLY (OUTPATIENT)
Dept: INTERNAL MEDICINE CLINIC | Age: 61
End: 2021-10-12
Payer: COMMERCIAL

## 2021-10-12 DIAGNOSIS — Z23 FLU VACCINE NEED: Primary | ICD-10-CM

## 2021-10-12 PROCEDURE — 90688 IIV4 VACCINE SPLT 0.5 ML IM: CPT | Performed by: INTERNAL MEDICINE

## 2021-10-12 PROCEDURE — 90471 IMMUNIZATION ADMIN: CPT | Performed by: INTERNAL MEDICINE

## 2021-10-12 PROCEDURE — 99999 PR OFFICE/OUTPT VISIT,PROCEDURE ONLY: CPT | Performed by: INTERNAL MEDICINE

## 2021-12-22 ENCOUNTER — HOSPITAL ENCOUNTER (OUTPATIENT)
Age: 61
Discharge: HOME | End: 2021-12-22
Payer: COMMERCIAL

## 2021-12-22 DIAGNOSIS — I10 ESSENTIAL HYPERTENSION: ICD-10-CM

## 2021-12-22 LAB
A/G RATIO: 2 (ref 1.1–2.2)
ALBUMIN SERPL-MCNC: 4.5 G/DL (ref 3.4–5)
ALP BLD-CCNC: 78 U/L (ref 40–129)
ALT SERPL-CCNC: 28 U/L (ref 10–40)
ANION GAP SERPL CALCULATED.3IONS-SCNC: 14 MMOL/L (ref 3–16)
AST SERPL-CCNC: 24 U/L (ref 15–37)
BILIRUB SERPL-MCNC: 0.3 MG/DL (ref 0–1)
BUN BLDV-MCNC: 18 MG/DL (ref 7–20)
CALCIUM SERPL-MCNC: 9.3 MG/DL (ref 8.3–10.6)
CHLORIDE BLD-SCNC: 105 MMOL/L (ref 99–110)
CO2: 23 MMOL/L (ref 21–32)
CREAT SERPL-MCNC: 1 MG/DL (ref 0.8–1.3)
GFR AFRICAN AMERICAN: >60
GFR NON-AFRICAN AMERICAN: >60
GLUCOSE BLD-MCNC: 98 MG/DL (ref 70–99)
POTASSIUM SERPL-SCNC: 4.1 MMOL/L (ref 3.5–5.1)
SODIUM BLD-SCNC: 142 MMOL/L (ref 136–145)
TOTAL PROTEIN: 6.8 G/DL (ref 6.4–8.2)

## 2021-12-22 PROCEDURE — 80053 COMPREHEN METABOLIC PANEL: CPT

## 2021-12-22 PROCEDURE — 36415 COLL VENOUS BLD VENIPUNCTURE: CPT

## 2021-12-26 NOTE — PROGRESS NOTES
Tristian Sahu 62 y.o. male presents today for an Established patient for   Chief Complaint   Patient presents with    Hyperlipidemia    Hypertension    Immunizations     2nd Shingles            ASSESSMENT/PLAN    1. Primary hypertension  Stable. Continue present medicine  - Comprehensive Metabolic Panel; Future    2. Elevated lipids       Reviewed lipid panel 1/16/2021 stable. 3. Obstructive sleep apnea syndrome               Stable continue present treatment    4. Irritable bladder             Stable continue present treatment    5. Insomnia, unspecified type             Stable continue present treatment    6. Gastroesophageal reflux disease without esophagitis             Discussed dietary restrictions    7. Irritable bowel syndrome, unspecified type                Stable present treatment    8. RLS (restless legs syndrome)               Continue present treatment    9. Need for shingles vaccine               Health maintenance  - Zoster recombinant ([address])    10. Special screening for malignant neoplasm of prostate                 Health maintenance  - PSA screening; Future       Return in about 6 months (around 6/27/2022) for HTN   LIPIDS. HPI:     Review last office visit with me: 7/26/2021. Patient with HTN, hyper lipidemia, JUSTICE, GERD, RLS, IBS, insomnia, acute left hip pain referred to Dr. Fidencio Busch of orthopedics. Reviewed: 8/6/2021 office visit podiatry Dr. Ladonna Maradiaga. DX plantar fasciitis/fibromatosis. Treat only with \"comfortable shoes\".   Reviewed laboratory data 12/22/2021: CMP: All normal.      Results for orders placed or performed during the hospital encounter of 12/22/21   Comprehensive Metabolic Panel   Result Value Ref Range    Sodium 142 136 - 145 mmol/L    Potassium 4.1 3.5 - 5.1 mmol/L    Chloride 105 99 - 110 mmol/L    CO2 23 21 - 32 mmol/L    Anion Gap 14 3 - 16    Glucose 98 70 - 99 mg/dL    BUN 18 7 - 20 mg/dL    CREATININE 1.0 0.8 - 1.3 mg/dL    GFR Non- >60 >60    GFR African American >60 >60    Calcium 9.3 8.3 - 10.6 mg/dL    Total Protein 6.8 6.4 - 8.2 g/dL    Albumin 4.5 3.4 - 5.0 g/dL    Albumin/Globulin Ratio 2.0 1.1 - 2.2    Total Bilirubin 0.3 0.0 - 1.0 mg/dL    Alkaline Phosphatase 78 40 - 129 U/L    ALT 28 10 - 40 U/L    AST 24 15 - 37 U/L              ROS:  Review of Systems   Constitutional: negative   HENT: negative   EYES: negative   Respiratory: JUSTICE baseline  Gastrointestinal: negative   Endocrine: negative   Musculoskeletal: Left hip pain. Skin: negative   Allergic/Immunological: negative   Hematological: negative   Psychiatric/Behavorial: negative   CV: negative   CNS: RLS stable. :Negative   S/E:Negative  Renal: Negative     Physical Exam:  Head/neck: Ears: Normal TM. No obstruction. Throat: Mass. Not examined. Thyroid is not palpable. .  Neck: No lymphadenopathy. Eyes: EOMI, PERRLA with no nystagmus  Lungs: Clear to auscultation. CV: S1-S2 normal. RODERICK murmur. Carotid: No bruit. Abdominal Examination: Bowel sounds present. Soft nontender. No mass no guarding or   rebound. Spine/extremities: No edema. No tenderness to palpation. Skin: No rash  CNS: Patient is alert, cooperative, moves all 4 limbs, ambulates without difficulty, light touch normal.   Good historian. Good orientation. Blood pressure 124/80, pulse 105, temperature 97 °F (36.1 °C), temperature source Temporal, weight 224 lb (101.6 kg), SpO2 99 %.          Sayra Ortiz MD

## 2021-12-27 ENCOUNTER — OFFICE VISIT (OUTPATIENT)
Dept: INTERNAL MEDICINE CLINIC | Age: 61
End: 2021-12-27
Payer: COMMERCIAL

## 2021-12-27 VITALS
HEART RATE: 105 BPM | SYSTOLIC BLOOD PRESSURE: 124 MMHG | BODY MASS INDEX: 29.55 KG/M2 | OXYGEN SATURATION: 99 % | WEIGHT: 224 LBS | DIASTOLIC BLOOD PRESSURE: 80 MMHG | TEMPERATURE: 97 F

## 2021-12-27 DIAGNOSIS — G47.00 INSOMNIA, UNSPECIFIED TYPE: ICD-10-CM

## 2021-12-27 DIAGNOSIS — I10 PRIMARY HYPERTENSION: Primary | ICD-10-CM

## 2021-12-27 DIAGNOSIS — G47.33 OBSTRUCTIVE SLEEP APNEA SYNDROME: ICD-10-CM

## 2021-12-27 DIAGNOSIS — K58.9 IRRITABLE BOWEL SYNDROME, UNSPECIFIED TYPE: ICD-10-CM

## 2021-12-27 DIAGNOSIS — Z23 NEED FOR SHINGLES VACCINE: ICD-10-CM

## 2021-12-27 DIAGNOSIS — K21.9 GASTROESOPHAGEAL REFLUX DISEASE WITHOUT ESOPHAGITIS: ICD-10-CM

## 2021-12-27 DIAGNOSIS — Z12.5 SPECIAL SCREENING FOR MALIGNANT NEOPLASM OF PROSTATE: ICD-10-CM

## 2021-12-27 DIAGNOSIS — G25.81 RLS (RESTLESS LEGS SYNDROME): ICD-10-CM

## 2021-12-27 DIAGNOSIS — N32.89 IRRITABLE BLADDER: ICD-10-CM

## 2021-12-27 DIAGNOSIS — E78.5 ELEVATED LIPIDS: ICD-10-CM

## 2021-12-27 PROCEDURE — 99213 OFFICE O/P EST LOW 20 MIN: CPT | Performed by: INTERNAL MEDICINE

## 2021-12-27 PROCEDURE — 90471 IMMUNIZATION ADMIN: CPT | Performed by: INTERNAL MEDICINE

## 2021-12-27 PROCEDURE — 90750 HZV VACC RECOMBINANT IM: CPT | Performed by: INTERNAL MEDICINE

## 2021-12-27 ASSESSMENT — PATIENT HEALTH QUESTIONNAIRE - PHQ9
SUM OF ALL RESPONSES TO PHQ QUESTIONS 1-9: 0
2. FEELING DOWN, DEPRESSED OR HOPELESS: 0
SUM OF ALL RESPONSES TO PHQ9 QUESTIONS 1 & 2: 0
SUM OF ALL RESPONSES TO PHQ QUESTIONS 1-9: 0
SUM OF ALL RESPONSES TO PHQ QUESTIONS 1-9: 0
1. LITTLE INTEREST OR PLEASURE IN DOING THINGS: 0

## 2022-02-08 ENCOUNTER — OFFICE VISIT (OUTPATIENT)
Dept: ORTHOPEDIC SURGERY | Age: 62
End: 2022-02-08
Payer: COMMERCIAL

## 2022-02-08 VITALS — HEIGHT: 73 IN | BODY MASS INDEX: 29.69 KG/M2 | WEIGHT: 224 LBS

## 2022-02-08 DIAGNOSIS — M25.852 HIP IMPINGEMENT SYNDROME, LEFT: ICD-10-CM

## 2022-02-08 DIAGNOSIS — M54.16 LUMBAR RADICULOPATHY: ICD-10-CM

## 2022-02-08 DIAGNOSIS — M25.552 LEFT HIP PAIN: Primary | ICD-10-CM

## 2022-02-08 DIAGNOSIS — M21.372 LEFT FOOT DROP: ICD-10-CM

## 2022-02-08 PROCEDURE — 99214 OFFICE O/P EST MOD 30 MIN: CPT | Performed by: ORTHOPAEDIC SURGERY

## 2022-02-08 NOTE — PROGRESS NOTES
Age of Onset    Cancer Mother         breast    High Blood Pressure Mother     Thyroid Disease Mother     Heart Disease Father 72        PTCA / CAD    Diabetes Father     Kidney Disease Father         kidney stones    High Cholesterol Father     Other Father         endartectomy    Cancer Sister         skin cancer     Social History     Tobacco Use    Smoking status: Never Smoker    Smokeless tobacco: Never Used   Substance Use Topics    Alcohol use: Yes     Comment: once a week      Current Outpatient Medications on File Prior to Visit   Medication Sig Dispense Refill    amLODIPine (NORVASC) 10 MG tablet TAKE 1 TABLET DAILY FOR HYPERTENSION 90 tablet 1    doxazosin (CARDURA) 4 MG tablet TAKE ONE-HALF (1/2) TABLET TWICE A DAY FOR HYPERTENSION AND PROSTATE MEDICINE 90 tablet 1    niacin (NIASPAN) 500 MG extended release tablet TAKE 1 TABLET NIGHTLY FOR HIGH CHOLESTEROL 90 tablet 1    gabapentin (NEURONTIN) 600 MG tablet TAKE ONE-HALF (1/2) TABLET TO 1 TABLET AT BEDTIME FOR NERVE PAIN/ SLEEP. UNUSED HALF TABLET SHOULD BE TAKEN AS NEXT DOSE 90 tablet 1    nortriptyline (PAMELOR) 25 MG capsule 1 to 3 pills an hour or 2 before bedtime for sleep 270 capsule 1    Coenzyme Q10 (CO Q 10) 100 MG CAPS Take 1 tablet by mouth daily. No current facility-administered medications on file prior to visit. ASCVD 10-YEAR RISK SCORE  The 10-year ASCVD risk score (Tavo Zhang, et al., 2013) is: 6.2%    Values used to calculate the score:      Age: 62 years      Sex: Male      Is Non- : No      Diabetic: No      Tobacco smoker: No      Systolic Blood Pressure: 241 mmHg      Is BP treated: Yes      HDL Cholesterol: 70 mg/dL      Total Cholesterol: 164 mg/dL   . Review of Systems  10-point ROS is negative other than HPI.     Physical Exam  Based off 1997 Exam Criteria  Ht 6' 1\" (1.854 m)   Wt 224 lb (101.6 kg)   BMI 29.55 kg/m²      Constitutional:       General: He is not in acute distress. Appearance: Normal appearance. Cardiovascular:      Rate and Rhythm: Normal rate and regular rhythm. Pulses: Normal pulses. Pulmonary:      Effort: Pulmonary effort is normal. No respiratory distress. Neurological:      Mental Status: He is alert and oriented to person, place, and time. Mental status is at baseline.      Musculoskeletal:  Gait:  antalgic    Spine / Hip Exam:      RIGHT  LEFT    Lumbar Spine Exam  [x] All Neg    [] All Neg     Straight leg raise  []  []Not tested   []  []Not tested    Clonus  []  []Not tested   []  []Not tested    Pain with motion  []  []Not tested   [x]  []Not tested    Radiculopathy  []  []Not tested   []  []Not tested    Paraspinal muscle tenderness  [] Paraspinal  []Midline   [] Paraspinal  []Midline   Sensation RIGHT  LEFT    L3  [x] Normal []Decreased    [x] Normal []Decreased   L4  [x] Normal  []Decreased   [x] Normal []Decreased   L5  [x] Normal []Decreased   [x] Normal []Decreased   S1  [x] Normal  []Decreased   [x] Normal []Decreased   Pelvis       Scoliosis  [x] Nml  [] Present     Leg-length discrepency  [x] Equal  [] Right longer   [] Left longer   Range of Motion Active Passive Active Passive   Hip Flexion 120  120    Abduction 50  50    External Rotation @ 90 flex 65  65    Internal Rotation @ 90 flex 20  20           Hip Impingement / Dysplasia  [] All Neg  [] Not tested   [] All Neg  [] Not tested    Hip impingement test  []  []Not tested   []  []Not tested    C-sign  []  []Not tested   []  []Not tested    Anterior instability apprehension  []  []Not tested   []  []Not tested    Posterior instability apprehension  []  []Not tested   []  []Not tested    Uncontained Internal rotation  []  []Not tested  []  []Not tested          Abductors  [] All Neg  [] Not tested   [] All Neg  [] Not tested    Medius strength  []  []Not tested   []  []Not tested    Minimum strength  []  []Not tested   []  []Not tested    IT band tendonitis  []  []Not tested []  []Not tested    Trochanteric tenderness  []  []Not tested  []  []Not tested   Sciatic neuropathic pain  []  []Not tested   []  []Not tested           Post-arthroplasty  [] All Neg  [] Not tested   [] All Neg  [] Not tested    Rectus tendonitis  []  []Not tested   []  []Not tested    Iliopsoas tendonitis       Start-up pain  []  []Not tested   []  []Not tested      3+/5 strength with dorsiflexion of the left ankle against resistance  Imaging    Left Hip: [address]: X-rays were ordered today and reviewed of the left hip.  3 views. AP pelvis, lateral, and false profile. They demonstrate no evidence of fractures or dislocations. Well-maintained joint space. Patient does have a moderate cam lesion present and a small osteophyte present femoral head. Procedure:  Orders Placed This Encounter   Procedures    XR HIP LEFT (2-3 VIEWS)     Standing Status:   Future     Number of Occurrences:   1     Standing Expiration Date:   2/8/2023     Order Specific Question:   Reason for exam:     Answer:   pain       Assessment and Plan  Aliya Wise was seen today for hip pain. Diagnoses and all orders for this visit:    Left hip pain  -     XR HIP LEFT (2-3 VIEWS); Future  -     MRI HIP LEFT WO CONTRAST; Future    Hip impingement syndrome, left  -     MRI HIP LEFT WO CONTRAST; Future    Lumbar radiculopathy    Left foot drop        Patient is suffering from left hip impingement syndrome. Patient order an MRI to evaluate for hip impingement and labral tear. My more concerning issue at this time is developing left foot drop. He has had 2 previous lumbar surgeries with Dr. Nain Lee. I believe his lumbar spine is responsible for his foot drop. Patient will follow up in the office after MRI of his hip to review results. I have informed him that the foot drop is of a more emergent problem and should call Dr. Nain Lee and get an appointment quickly.       I discussed with Shagufta Edgar that his history, symptoms, signs and imaging are most consistent with femoro-acetabular impingement    We reviewed the natural history of these conditions and treatment options ranging from conservative measures (rest, icing, activity modification, physical therapy, pain meds, cortisone injection) to surgical options. Based on his symptoms I recommended the following imaging studies: MRI. Script was provided. After imaging is completed, patient will make a follow up appointment to review imaging. In terms of treatment, I recommended continuing with rest, icing, avoidance of painful activities, NSAIDs or pain meds as tolerated, and physical therapy. If these are not effective, cortisone injection can be considered. We discussed surgical options as well, should conservative measures fail. Electronically signed by Edenilson Burnett MD on 2/8/2022 at 3:44 PM  This dictation was generated by voice recognition computer software. Although all attempts are made to edit the dictation for accuracy, there may be errors in the transcription that are not intended.

## 2022-02-10 ENCOUNTER — TELEPHONE (OUTPATIENT)
Dept: ORTHOPEDIC SURGERY | Age: 62
End: 2022-02-10

## 2022-02-10 NOTE — TELEPHONE ENCOUNTER
MRI IS APPROVED FOR [facility] Scheuermann. [facility] WILL CALL PATIENT TO SCHEDULE MRI. ONCE MRI IS SCHEDULED, PATIENT MAY CALL TO MAKE A FOLLOW UP APPOINTMENT IN OFFICE FOR RESULTS.

## 2022-02-26 DIAGNOSIS — G25.81 RLS (RESTLESS LEGS SYNDROME): ICD-10-CM

## 2022-02-26 DIAGNOSIS — I10 ESSENTIAL HYPERTENSION: ICD-10-CM

## 2022-02-26 DIAGNOSIS — E78.5 ELEVATED LIPIDS: ICD-10-CM

## 2022-02-27 RX ORDER — NIACIN 500 MG/1
TABLET, EXTENDED RELEASE ORAL
Qty: 90 TABLET | Refills: 1 | Status: SHIPPED | OUTPATIENT
Start: 2022-02-27 | End: 2022-06-21

## 2022-02-27 RX ORDER — GABAPENTIN 600 MG/1
TABLET ORAL
Qty: 90 TABLET | Refills: 1 | Status: SHIPPED | OUTPATIENT
Start: 2022-02-27 | End: 2022-06-21

## 2022-02-27 RX ORDER — AMLODIPINE BESYLATE 10 MG/1
TABLET ORAL
Qty: 90 TABLET | Refills: 1 | Status: SHIPPED | OUTPATIENT
Start: 2022-02-27 | End: 2022-06-21

## 2022-02-27 RX ORDER — DOXAZOSIN MESYLATE 4 MG/1
TABLET ORAL
Qty: 90 TABLET | Refills: 1 | Status: SHIPPED | OUTPATIENT
Start: 2022-02-27 | End: 2022-06-21

## 2022-04-07 ENCOUNTER — OFFICE VISIT (OUTPATIENT)
Dept: ORTHOPEDIC SURGERY | Age: 62
End: 2022-04-07
Payer: COMMERCIAL

## 2022-04-07 VITALS — HEIGHT: 73 IN | BODY MASS INDEX: 29.69 KG/M2 | WEIGHT: 224 LBS

## 2022-04-07 DIAGNOSIS — M25.852 HIP IMPINGEMENT SYNDROME, LEFT: Primary | ICD-10-CM

## 2022-04-07 DIAGNOSIS — S73.192A TEAR OF LEFT ACETABULAR LABRUM, INITIAL ENCOUNTER: ICD-10-CM

## 2022-04-07 DIAGNOSIS — M54.16 LUMBAR RADICULOPATHY: ICD-10-CM

## 2022-04-07 PROCEDURE — 20611 DRAIN/INJ JOINT/BURSA W/US: CPT | Performed by: ORTHOPAEDIC SURGERY

## 2022-04-07 PROCEDURE — 99214 OFFICE O/P EST MOD 30 MIN: CPT | Performed by: ORTHOPAEDIC SURGERY

## 2022-04-07 RX ORDER — BETAMETHASONE SODIUM PHOSPHATE AND BETAMETHASONE ACETATE 3; 3 MG/ML; MG/ML
12 INJECTION, SUSPENSION INTRA-ARTICULAR; INTRALESIONAL; INTRAMUSCULAR; SOFT TISSUE ONCE
Status: COMPLETED | OUTPATIENT
Start: 2022-04-07 | End: 2022-04-07

## 2022-04-07 RX ORDER — LIDOCAINE HYDROCHLORIDE 10 MG/ML
10 INJECTION, SOLUTION INFILTRATION; PERINEURAL ONCE
Status: COMPLETED | OUTPATIENT
Start: 2022-04-07 | End: 2022-04-07

## 2022-04-07 RX ORDER — BUPIVACAINE HYDROCHLORIDE 2.5 MG/ML
2.5 INJECTION, SOLUTION INFILTRATION; PERINEURAL ONCE
Status: COMPLETED | OUTPATIENT
Start: 2022-04-07 | End: 2022-04-07

## 2022-04-07 RX ADMIN — BETAMETHASONE SODIUM PHOSPHATE AND BETAMETHASONE ACETATE 12 MG: 3; 3 INJECTION, SUSPENSION INTRA-ARTICULAR; INTRALESIONAL; INTRAMUSCULAR; SOFT TISSUE at 16:05

## 2022-04-07 RX ADMIN — LIDOCAINE HYDROCHLORIDE 10 MG: 10 INJECTION, SOLUTION INFILTRATION; PERINEURAL at 16:06

## 2022-04-07 RX ADMIN — BUPIVACAINE HYDROCHLORIDE 2.5 MG: 2.5 INJECTION, SOLUTION INFILTRATION; PERINEURAL at 16:06

## 2022-04-07 NOTE — PROGRESS NOTES
Dr Dawn Campbell      Date /Time 4/7/2022       3:44 PM EST  Name Loyd Aschoff             1960   Location  [facility]  MRN [id number]                Chief Complaint   Patient presents with    Results     TR MRI LEFT HIP        History of Present Illness    Loyd Aschoff is a 62 y.o. male who presents with  left hip pain. .  Athletic/exercise activity: no sports. Injury Mechanism:  none. Worker's Comp. & legal issues:   none. Previous Treatments: Ice, Heat and NSAIDs    Patient is here for follow-up visit. He is here for MRI results left hip. He has also had an MRI of his lumbar spine. We believe that his last visit that his foot drop was caused by his lumbar spine. He was supposed to follow-up with Dr. Zen Beth. He continues to complain of pain over the groin. Previous history: Patient presents the office today for a new problem. Patient is here with a chief complaint of left hip pain. Patient's left hip is painful over the groin. He also complains of radicular symptoms in his left lower extremity and cramping in his calf. He has had no direct injury or trauma. He has tried anti-inflammatory medications activity modifications and rest without improvement. He has had 2 lumbar surgeries done by Dr. Zen Beth.   In addition patient has noticed weakness in his left lower extremity including difficulties dorsiflexing his foot    Past History  Past Medical History:   Diagnosis Date    Allergic rhinitis     Chronic back pain     Chronic sinus infection     GERD (gastroesophageal reflux disease)     Hyperlipidemia     Hypertension     IBS (irritable bowel syndrome)     Insomnia     Irritable bladder     RLS (restless legs syndrome) 9/21/2012    Sleep apnea     uses mouthguard    Wears glasses      Past Surgical History:   Procedure Laterality Date    CATARACT REMOVAL WITH IMPLANT Right     COLONOSCOPY  12/2003    normal    COLONOSCOPY  10/24/14    NL Redo 10 yrs    CYST REMOVAL  1995    sebacous cyst head    EYE SURGERY Left 9/16/14    Phaco emulsification with IOL implant    SPINE SURGERY  2001    L5-S1 discectomy    SPINE SURGERY  2010    L4-L5 discectomy    TONSILLECTOMY AND ADENOIDECTOMY  1971     Family History   Problem Relation Age of Onset    Cancer Mother         breast    High Blood Pressure Mother     Thyroid Disease Mother     Heart Disease Father 72        PTCA / CAD    Diabetes Father     Kidney Disease Father         kidney stones    High Cholesterol Father     Other Father         endartectomy    Cancer Sister         skin cancer     Social History     Tobacco Use    Smoking status: Never Smoker    Smokeless tobacco: Never Used   Substance Use Topics    Alcohol use: Yes     Comment: once a week      Current Outpatient Medications on File Prior to Visit   Medication Sig Dispense Refill    niacin (NIASPAN) 500 MG extended release tablet TAKE 1 TABLET NIGHTLY FOR HIGH CHOLESTEROL 90 tablet 1    gabapentin (NEURONTIN) 600 MG tablet TAKE ONE-HALF (1/2) TABLET TO 1 TABLET AT BEDTIME FOR NERVE PAIN/ SLEEP. 90 tablet 1    amLODIPine (NORVASC) 10 MG tablet TAKE 1 TABLET DAILY FOR HYPERTENSION 90 tablet 1    doxazosin (CARDURA) 4 MG tablet TAKE ONE-HALF (1/2) TABLET TWICE A DAY FOR HYPERTENSION AND PROSTATE MEDICINE 90 tablet 1    nortriptyline (PAMELOR) 25 MG capsule 1 to 3 pills an hour or 2 before bedtime for sleep 270 capsule 1    Coenzyme Q10 (CO Q 10) 100 MG CAPS Take 1 tablet by mouth daily. No current facility-administered medications on file prior to visit.         ASCVD 10-YEAR RISK SCORE  The 10-year ASCVD risk score (Kristan Lawson et al., 2013) is: 6.2%    Values used to calculate the score:      Age: 62 years      Sex: Male      Is Non- : No      Diabetic: No      Tobacco smoker: No      Systolic Blood Pressure: 422 mmHg      Is BP treated: Yes      HDL Cholesterol: 70 mg/dL      Total Cholesterol: 164 mg/dL .  Review of Systems  10-point ROS is negative other than HPI. Physical Exam  Based off 1997 Exam Criteria  Ht 6' 1\" (1.854 m)   Wt 224 lb (101.6 kg)   BMI 29.55 kg/m²      Constitutional:       General: He is not in acute distress. Appearance: Normal appearance. Cardiovascular:      Rate and Rhythm: Normal rate and regular rhythm. Pulses: Normal pulses. Pulmonary:      Effort: Pulmonary effort is normal. No respiratory distress. Neurological:      Mental Status: He is alert and oriented to person, place, and time. Mental status is at baseline.      Musculoskeletal:  Gait:  antalgic    Spine / Hip Exam:      RIGHT  LEFT    Lumbar Spine Exam  [x] All Neg    [] All Neg     Straight leg raise  []  []Not tested   []  []Not tested    Clonus  []  []Not tested   []  []Not tested    Pain with motion  []  []Not tested   [x]  []Not tested    Radiculopathy  []  []Not tested   []  []Not tested    Paraspinal muscle tenderness  [] Paraspinal  []Midline   [] Paraspinal  []Midline   Sensation RIGHT  LEFT    L3  [x] Normal []Decreased    [x] Normal []Decreased   L4  [x] Normal  []Decreased   [x] Normal []Decreased   L5  [x] Normal []Decreased   [x] Normal []Decreased   S1  [x] Normal  []Decreased   [x] Normal []Decreased   Pelvis       Scoliosis  [x] Nml  [] Present     Leg-length discrepency  [x] Equal  [] Right longer   [] Left longer   Range of Motion Active Passive Active Passive   Hip Flexion 120  120    Abduction 50  50    External Rotation @ 90 flex 65  65    Internal Rotation @ 90 flex 20  20           Hip Impingement / Dysplasia  [x] All Neg  [] Not tested   [] All Neg  [] Not tested    Hip impingement test  []  []Not tested   [x]  []Not tested    C-sign  []  []Not tested   [x]  []Not tested    Anterior instability apprehension  []  []Not tested   []  []Not tested    Posterior instability apprehension  []  []Not tested   []  []Not tested    Uncontained Internal rotation  []  []Not tested  []  []Not tested          Abductors  [x] All Neg  [] Not tested   [x] All Neg  [] Not tested    Medius strength  []  []Not tested   []  []Not tested    Minimum strength  []  []Not tested   []  []Not tested    IT band tendonitis  []  []Not tested   []  []Not tested    Trochanteric tenderness  []  []Not tested  []  []Not tested   Sciatic neuropathic pain  []  []Not tested   []  []Not tested           Post-arthroplasty  [] All Neg  [] Not tested   [] All Neg  [] Not tested    Rectus tendonitis  []  []Not tested   []  []Not tested    Iliopsoas tendonitis       Start-up pain  []  []Not tested   []  []Not tested      3+/5 strength with dorsiflexion of the left ankle against resistance  Imaging    Left Hip: [facility] [place]  Previous Radiographs: X-rays were ordered today and reviewed of the left hip.  3 views. AP pelvis, lateral, and false profile. They demonstrate no evidence of fractures or dislocations. Well-maintained joint space. Patient does have a moderate cam lesion present and a small osteophyte present femoral head. Impression moderate left hip osteoarthritis with DAVIDA. Anterior superior labral tear  Left hip capsulitis present    Procedure:  No orders of the defined types were placed in this encounter. Assessment and Plan  Rubbie Klinefelter was seen today for results. Diagnoses and all orders for this visit:    Hip impingement syndrome, left  -     US ARTHR/ASP/INJ MAJOR JNT/BURSA LEFT; Future    Lumbar radiculopathy    Tear of left acetabular labrum, initial encounter    Other orders  -     bupivacaine (MARCAINE) 0.25 % injection 2.5 mg  -     lidocaine 1 % injection 10 mg  -     betamethasone acetate-betamethasone sodium phosphate (CELESTONE) injection 12 mg               I discussed with Ward Marcelino that his history, symptoms, signs and imaging are most consistent with femoro-acetabular impingement. Hip OA , and lumbar radiculopathy    Patient does have hip impingement and osteoarthritis. I will perform a left hip intra-articular cortisone injection today with ultrasound guidance. Again I have referred the patient to his spine surgeon Dr. Romana Dunning concerning his foot drop. We will see him back in 3 months for his hip or sooner if problems arise. Patient will follow up in 3 months. At that time he may be a good candidate for hip arthroscopy. We reviewed the natural history of these conditions and treatment options ranging from conservative measures (rest, icing, activity modification, physical therapy, pain meds, cortisone injection) to surgical options. In terms of treatment, I recommended continuing with rest, icing, avoidance of painful activities, NSAIDs or pain meds as tolerated, and physical therapy. Left Hip Cortisone Injection - Ultrasound-guided CPT: 49015   Consent was obtained after discussion of the risks, benefits, alternatives, including, but not limited to bleeding, pain, infection, skin disruption or discoloration. Laterality was confirmed (timeout). The hip was prepped with alcohol.  Deep ultrasound was used to visualize the femoral head, neck, and acetabular rim. 22-gauge spinal needle was used. I confirmed transducer orientation along the axis of the femoral neck. [employer] ultrasound unit was used with a variable frequency (6.0-15.0 MHz) linear transducer. Ultrasound-guided needle localization to the hip joint was performed. Confirmation of needle placement was performed, and the image was stored  A formulation of 2cc of Celestone, 1cc of 1% lidocaine, 1cc of 0.25% sensoricaine was injected into the hip. Appropriate insufflation deep to the hip capsule was visualized. The site was cleaned and dressed with a band aid. Images were taken and saved for permanent record. We discussed surgical options as well, should conservative measures fail.     Electronically signed by Edgar Peterson MD on 4/7/2022 at 3:51 PM  This dictation was generated by voice recognition computer software. Although all attempts are made to edit the dictation for accuracy, there may be errors in the transcription that are not intended.

## 2022-06-20 DIAGNOSIS — E78.5 ELEVATED LIPIDS: ICD-10-CM

## 2022-06-20 DIAGNOSIS — G25.81 RLS (RESTLESS LEGS SYNDROME): ICD-10-CM

## 2022-06-20 DIAGNOSIS — I10 ESSENTIAL HYPERTENSION: ICD-10-CM

## 2022-06-21 ENCOUNTER — HOSPITAL ENCOUNTER (OUTPATIENT)
Age: 62
Discharge: HOME | End: 2022-06-21
Payer: COMMERCIAL

## 2022-06-21 DIAGNOSIS — I10 PRIMARY HYPERTENSION: ICD-10-CM

## 2022-06-21 DIAGNOSIS — Z12.5 SPECIAL SCREENING FOR MALIGNANT NEOPLASM OF PROSTATE: ICD-10-CM

## 2022-06-21 LAB
A/G RATIO: 2.1 (ref 1.1–2.2)
ALBUMIN SERPL-MCNC: 4.5 G/DL (ref 3.4–5)
ALP BLD-CCNC: 80 U/L (ref 40–129)
ALT SERPL-CCNC: 23 U/L (ref 10–40)
ANION GAP SERPL CALCULATED.3IONS-SCNC: 14 MMOL/L (ref 3–16)
AST SERPL-CCNC: 28 U/L (ref 15–37)
BILIRUB SERPL-MCNC: 0.3 MG/DL (ref 0–1)
BUN BLDV-MCNC: 17 MG/DL (ref 7–20)
CALCIUM SERPL-MCNC: 9.3 MG/DL (ref 8.3–10.6)
CHLORIDE BLD-SCNC: 100 MMOL/L (ref 99–110)
CO2: 24 MMOL/L (ref 21–32)
CREAT SERPL-MCNC: 1 MG/DL (ref 0.8–1.3)
GFR AFRICAN AMERICAN: >60
GFR NON-AFRICAN AMERICAN: >60
GLUCOSE BLD-MCNC: 84 MG/DL (ref 70–99)
POTASSIUM SERPL-SCNC: 4.2 MMOL/L (ref 3.5–5.1)
PROSTATE SPECIFIC ANTIGEN: 0.63 NG/ML (ref 0–4)
SODIUM BLD-SCNC: 138 MMOL/L (ref 136–145)
TOTAL PROTEIN: 6.6 G/DL (ref 6.4–8.2)

## 2022-06-21 PROCEDURE — 80053 COMPREHEN METABOLIC PANEL: CPT

## 2022-06-21 PROCEDURE — 84153 ASSAY OF PSA TOTAL: CPT

## 2022-06-21 PROCEDURE — 36415 COLL VENOUS BLD VENIPUNCTURE: CPT

## 2022-06-21 RX ORDER — GABAPENTIN 600 MG/1
TABLET ORAL
Qty: 90 TABLET | Refills: 1 | Status: SHIPPED | OUTPATIENT
Start: 2022-06-21 | End: 2022-09-21

## 2022-06-21 RX ORDER — DOXAZOSIN MESYLATE 4 MG/1
TABLET ORAL
Qty: 90 TABLET | Refills: 1 | Status: SHIPPED | OUTPATIENT
Start: 2022-06-21 | End: 2022-11-04

## 2022-06-21 RX ORDER — NIACIN 500 MG/1
TABLET, EXTENDED RELEASE ORAL
Qty: 90 TABLET | Refills: 1 | Status: SHIPPED | OUTPATIENT
Start: 2022-06-21 | End: 2022-11-04

## 2022-06-21 RX ORDER — AMLODIPINE BESYLATE 10 MG/1
TABLET ORAL
Qty: 90 TABLET | Refills: 1 | Status: SHIPPED | OUTPATIENT
Start: 2022-06-21 | End: 2022-11-04

## 2022-06-21 NOTE — TELEPHONE ENCOUNTER
Refill request for amLODIPine Besylate 10 MG Oral Tablet, Doxazosin Mesylate 4 MG Oral Tablet,Gabapentin 600 MG Oral Tablet,NIACIN  500MG  TAB  EXTENDED RELEASE medication.      Name of [address] [place] [address] visit - 12-     Pending visit - 6-    Last refill - 5-      Medication Contract signed -   Last Ruddy Morton ran-         Additional Comments

## 2022-06-26 NOTE — PROGRESS NOTES
Rani Jett 65 y.o. male presents today for an Established patient for   Chief Complaint   Patient presents with    Hypertension            ASSESSMENT/PLAN    1. Primary hypertension              stable. Continue present medicine  - Comprehensive Metabolic Panel; Future    2. Elevated lipids                      no lipid panel with his blood draw  - Lipid Panel; Future    3. Obstructive sleep apnea syndrome             using mouthguard    4. Gastroesophageal reflux disease without esophagitis                stable. 5. Insomnia, unspecified type               increase nortriptyline 25 mg take 2 at bedtime    6. Irritable bowel syndrome, unspecified type                   stable. 7. RLS (restless legs syndrome)                continue to monitor    8. Left hip pain              follow-up with orthopedics    9. Low back pain               follow-up with his back surgeon. HPI:        Reviewed last office visit with me: 12/27/2021 at that time HTN was stable. Lipids stable. JUSTICE stable. Irritable bladder stable. Insomnia stable. GERD discussed dietary restrictions. IBS stable. RLS continue present medicine  Medicines reviewed. Reviewed laboratory data.:  6/21/2022. Chemistry panel: Normal.  PSA 0.63. Health maintenance reviewed. Reviewed office visit: 4/7/2022 Dr. Marga Malik orthopedics: DX left hip impingement syndrome and arthritis, lumbar radiculopathy follow-up with Dr. Richard Mae who is performed previous lumbar surgeries for the patient, tear of left acetabular labrum. Injection Marcaine, lidocaine, dexamethasone  Complains of insomnia but only taking nortriptyline 25 mg just 1 before bedtime. I suggested to before bedtime. To pain followed by orthopedics. Seen by Dr. Richard Mae for evaluation of back pain.     Results for orders placed or performed during the hospital encounter of 06/21/22   PSA screening   Result Value Ref Range    PSA 0.63 0.00 - 4.00 ng/mL   Comprehensive Metabolic Panel Result Value Ref Range    Sodium 138 136 - 145 mmol/L    Potassium 4.2 3.5 - 5.1 mmol/L    Chloride 100 99 - 110 mmol/L    CO2 24 21 - 32 mmol/L    Anion Gap 14 3 - 16    Glucose 84 70 - 99 mg/dL    BUN 17 7 - 20 mg/dL    CREATININE 1.0 0.8 - 1.3 mg/dL    GFR Non-African American >60 >60    GFR African American >60 >60    Calcium 9.3 8.3 - 10.6 mg/dL    Total Protein 6.6 6.4 - 8.2 g/dL    Albumin 4.5 3.4 - 5.0 g/dL    Albumin/Globulin Ratio 2.1 1.1 - 2.2    Total Bilirubin 0.3 0.0 - 1.0 mg/dL    Alkaline Phosphatase 80 40 - 129 U/L    ALT 23 10 - 40 U/L    AST 28 15 - 37 U/L              ROS:  Review of Systems   Constitutional: negative   HENT: negative   EYES: negative   Respiratory: negative   Gastrointestinal: negative   Endocrine: negative   Musculoskeletal: Back pain. Previous surgery. Left hip pain seen by orthopedics  Skin: negative   Allergic/Immunological: negative   Hematological: negative   Psychiatric/Behavorial: Complains of insomnia  CV: negative    CNS: negative   :Negative   S/E:Negative  Renal: Negative     Physical Exam:  Head/neck: Ears: Normal TM. No obstruction. Throat: Mask. Not examined. Thyroid is not palpable. Neck: No lymphadenopathy. Eyes: EOMI, PERRLA with no nystagmus  Lungs: Clear to auscultation. CV: S1-S2 normal.   RODERICK murmur. Carotid: No bruit. Abdominal Examination: Bowel sounds present. Soft nontender. No mass no guarding or   rebound. Spine/extremities: No edema. No tenderness to palpation. Skin: No rash  CNS: Patient is alert, cooperative, moves all 4 limbs, ambulates without difficulty, light touch normal.   Good historian. Good orientation. Blood pressure 134/84, pulse 82, temperature 97.9 °F (36.6 °C), temperature source Temporal, weight 222 lb (100.7 kg), SpO2 100 %.         Melvina Hernandez MD

## 2022-06-27 ENCOUNTER — OFFICE VISIT (OUTPATIENT)
Dept: INTERNAL MEDICINE CLINIC | Age: 62
End: 2022-06-27
Payer: COMMERCIAL

## 2022-06-27 VITALS
TEMPERATURE: 97.9 F | SYSTOLIC BLOOD PRESSURE: 134 MMHG | HEART RATE: 82 BPM | OXYGEN SATURATION: 100 % | WEIGHT: 222 LBS | DIASTOLIC BLOOD PRESSURE: 84 MMHG | BODY MASS INDEX: 29.29 KG/M2

## 2022-06-27 DIAGNOSIS — M25.552 CHRONIC HIP PAIN, LEFT: ICD-10-CM

## 2022-06-27 DIAGNOSIS — G89.29 CHRONIC MIDLINE LOW BACK PAIN, UNSPECIFIED WHETHER SCIATICA PRESENT: ICD-10-CM

## 2022-06-27 DIAGNOSIS — G47.33 OBSTRUCTIVE SLEEP APNEA SYNDROME: ICD-10-CM

## 2022-06-27 DIAGNOSIS — I10 PRIMARY HYPERTENSION: Primary | ICD-10-CM

## 2022-06-27 DIAGNOSIS — G47.00 INSOMNIA, UNSPECIFIED TYPE: ICD-10-CM

## 2022-06-27 DIAGNOSIS — M54.50 CHRONIC MIDLINE LOW BACK PAIN, UNSPECIFIED WHETHER SCIATICA PRESENT: ICD-10-CM

## 2022-06-27 DIAGNOSIS — K21.9 GASTROESOPHAGEAL REFLUX DISEASE WITHOUT ESOPHAGITIS: ICD-10-CM

## 2022-06-27 DIAGNOSIS — G25.81 RLS (RESTLESS LEGS SYNDROME): ICD-10-CM

## 2022-06-27 DIAGNOSIS — G89.29 CHRONIC HIP PAIN, LEFT: ICD-10-CM

## 2022-06-27 DIAGNOSIS — K58.9 IRRITABLE BOWEL SYNDROME, UNSPECIFIED TYPE: ICD-10-CM

## 2022-06-27 DIAGNOSIS — E78.5 ELEVATED LIPIDS: ICD-10-CM

## 2022-06-27 PROCEDURE — 99214 OFFICE O/P EST MOD 30 MIN: CPT | Performed by: INTERNAL MEDICINE

## 2022-06-27 SDOH — ECONOMIC STABILITY: FOOD INSECURITY: WITHIN THE PAST 12 MONTHS, THE FOOD YOU BOUGHT JUST DIDN'T LAST AND YOU DIDN'T HAVE MONEY TO GET MORE.: NEVER TRUE

## 2022-06-27 SDOH — ECONOMIC STABILITY: FOOD INSECURITY: WITHIN THE PAST 12 MONTHS, YOU WORRIED THAT YOUR FOOD WOULD RUN OUT BEFORE YOU GOT MONEY TO BUY MORE.: NEVER TRUE

## 2022-06-27 ASSESSMENT — PATIENT HEALTH QUESTIONNAIRE - PHQ9
2. FEELING DOWN, DEPRESSED OR HOPELESS: 0
SUM OF ALL RESPONSES TO PHQ QUESTIONS 1-9: 0
SUM OF ALL RESPONSES TO PHQ QUESTIONS 1-9: 0
1. LITTLE INTEREST OR PLEASURE IN DOING THINGS: 0
SUM OF ALL RESPONSES TO PHQ QUESTIONS 1-9: 0
SUM OF ALL RESPONSES TO PHQ QUESTIONS 1-9: 0
SUM OF ALL RESPONSES TO PHQ9 QUESTIONS 1 & 2: 0

## 2022-06-27 ASSESSMENT — SOCIAL DETERMINANTS OF HEALTH (SDOH): HOW HARD IS IT FOR YOU TO PAY FOR THE VERY BASICS LIKE FOOD, HOUSING, MEDICAL CARE, AND HEATING?: NOT HARD AT ALL

## 2022-07-07 ENCOUNTER — PATIENT MESSAGE (OUTPATIENT)
Dept: INTERNAL MEDICINE CLINIC | Age: 62
End: 2022-07-07

## 2022-07-07 DIAGNOSIS — G47.00 INSOMNIA, UNSPECIFIED TYPE: ICD-10-CM

## 2022-07-11 RX ORDER — NORTRIPTYLINE HYDROCHLORIDE 25 MG/1
CAPSULE ORAL
Qty: 270 CAPSULE | Refills: 1 | Status: SHIPPED | OUTPATIENT
Start: 2022-07-11

## 2022-07-11 NOTE — TELEPHONE ENCOUNTER
Refill request for noritrtyline  medication. Name of Pharmacy- [facility]       Last visit - 6-     Pending visit - 1-    Last refill -4-      Medication Contract signed -   Last Oarrs ran-         Additional Comments   From: Beryl Child  To: Dr. Zavala Precise: 7/7/2022 10:14 PM EDT  Subject: Nortriptyline Refill     please send a prescription refill to [facility].     Thanks

## 2022-08-09 ENCOUNTER — OFFICE VISIT (OUTPATIENT)
Dept: ORTHOPEDIC SURGERY | Age: 62
End: 2022-08-09
Payer: COMMERCIAL

## 2022-08-09 VITALS — BODY MASS INDEX: 29.42 KG/M2 | WEIGHT: 222 LBS | HEIGHT: 73 IN

## 2022-08-09 DIAGNOSIS — M70.62 TROCHANTERIC BURSITIS OF LEFT HIP: ICD-10-CM

## 2022-08-09 DIAGNOSIS — M54.16 LUMBAR RADICULOPATHY: ICD-10-CM

## 2022-08-09 DIAGNOSIS — M25.852 HIP IMPINGEMENT SYNDROME, LEFT: Primary | ICD-10-CM

## 2022-08-09 DIAGNOSIS — M16.12 PRIMARY OSTEOARTHRITIS OF LEFT HIP: ICD-10-CM

## 2022-08-09 PROCEDURE — 99213 OFFICE O/P EST LOW 20 MIN: CPT | Performed by: PHYSICIAN ASSISTANT

## 2022-08-09 NOTE — PROGRESS NOTES
Dr Mei Polanco      Date /Time 8/9/2022       3:44 PM EST  Name Tristian Sahu             1960   Location  [facility]  MRN [id number]                Chief Complaint   Patient presents with    Follow-up     Left hip Injection 04/07/2022        History of Present Illness    Tristian Sahu is a 65 y.o. male who presents with  left hip pain. .  Athletic/exercise activity: no sports. Injury Mechanism:  none. Worker's Comp. & legal issues:   none. Previous Treatments: Ice, Heat and NSAIDs    Patient presents the office today for a follow-up visit. Patient here with a chief complaint of left hip pain. Patient did have an intra-articular injection on 4/7/2022. The injection did work well. His groin and lateral hip pain did improve. He also saw Dr. Ely Lezama. Patient does have a spondylolisthesis which is responsible for his foot drop. Dr. Lincoln Sanchez did recommend physical therapy which has helped not only his hip but his lumbar spine. They also talked about lumbar fusion at some point in the future. Today his symptoms have started to return but are concentrated lateral.  He has no groin pain. He has had no new injury or trauma    Previous history: Patient is here for follow-up visit. He is here for MRI results left hip. He has also had an MRI of his lumbar spine. We believe that his last visit that his foot drop was caused by his lumbar spine. He was supposed to follow-up with Dr. Ely Lezama. He continues to complain of pain over the groin. Previous history: Patient presents the office today for a new problem. Patient is here with a chief complaint of left hip pain. Patient's left hip is painful over the groin. He also complains of radicular symptoms in his left lower extremity and cramping in his calf. He has had no direct injury or trauma. He has tried anti-inflammatory medications activity modifications and rest without improvement.   He has had 2 lumbar surgeries done by  [facility].   In addition patient has noticed weakness in his left lower extremity including difficulties dorsiflexing his foot    Past History  Past Medical History:   Diagnosis Date    Allergic rhinitis     Chronic back pain     Chronic sinus infection     GERD (gastroesophageal reflux disease)     Hyperlipidemia     Hypertension     IBS (irritable bowel syndrome)     Insomnia     Irritable bladder     RLS (restless legs syndrome) 9/21/2012    Sleep apnea     uses mouthguard    Wears glasses      Past Surgical History:   Procedure Laterality Date    CATARACT REMOVAL WITH IMPLANT Right     COLONOSCOPY  12/2003    normal    COLONOSCOPY  10/24/14    NL Redo 10 yrs    CYST REMOVAL  1995    sebacous cyst head    EYE SURGERY Left 9/16/14    Phaco emulsification with IOL implant    SPINE SURGERY  2001    L5-S1 discectomy    SPINE SURGERY  2010    L4-L5 discectomy    TONSILLECTOMY AND ADENOIDECTOMY  1971     Family History   Problem Relation Age of Onset    Cancer Mother         breast    High Blood Pressure Mother     Thyroid Disease Mother     Heart Disease Father 72        PTCA / CAD    Diabetes Father     Kidney Disease Father         kidney stones    High Cholesterol Father     Other Father         endartectomy    Cancer Sister         skin cancer     Social History     Tobacco Use    Smoking status: Never    Smokeless tobacco: Never   Substance Use Topics    Alcohol use: Yes     Comment: once a week      Current Outpatient Medications on File Prior to Visit   Medication Sig Dispense Refill    nortriptyline (PAMELOR) 25 MG capsule 1 to 3 pills an hour or 2 before bedtime for sleep 270 capsule 1    amLODIPine (NORVASC) 10 MG tablet TAKE 1 TABLET BY MOUTH  DAILY FOR HYPERTENSION 90 tablet 1    doxazosin (CARDURA) 4 MG tablet TAKE ONE-HALF TABLET BY  MOUTH TWICE DAILY FOR  HYPERTENSION AND PROSTATE 90 tablet 1    gabapentin (NEURONTIN) 600 MG tablet TAKE 1/2 TO 1 TABLET BY  MOUTH AT BEDTIME FOR NERVE  PAIN / SLEEP 90 tablet 1    niacin (NIASPAN) 500 MG extended release tablet TAKE 1 TABLET BY MOUTH AT  NIGHT FOR HIGH CHOLESTEROL 90 tablet 1    Coenzyme Q10 (CO Q 10) 100 MG CAPS Take 1 tablet by mouth daily. No current facility-administered medications on file prior to visit. ASCVD 10-YEAR RISK SCORE  The 10-year ASCVD risk score (Arielle Nye, et al., 2013) is: 7.9%    Values used to calculate the score:      Age: 65 years      Sex: Male      Is Non- : No      Diabetic: No      Tobacco smoker: No      Systolic Blood Pressure: 884 mmHg      Is BP treated: Yes      HDL Cholesterol: 70 mg/dL      Total Cholesterol: 164 mg/dL   . Review of Systems  10-point ROS is negative other than HPI. Physical Exam  Based off 1997 Exam Criteria  Ht 6' 1\" (1.854 m)   Wt 222 lb (100.7 kg)   BMI 29.29 kg/m²      Constitutional:       General: He is not in acute distress. Appearance: Normal appearance. Cardiovascular:      Rate and Rhythm: Normal rate and regular rhythm. Pulses: Normal pulses. Pulmonary:      Effort: Pulmonary effort is normal. No respiratory distress. Neurological:      Mental Status: He is alert and oriented to person, place, and time. Mental status is at baseline.      Musculoskeletal:  Gait:  antalgic    Spine / Hip Exam:      RIGHT  LEFT    Lumbar Spine Exam  [x] All Neg    [] All Neg     Straight leg raise  []  []Not tested   []  []Not tested    Clonus  []  []Not tested   []  []Not tested    Pain with motion  []  []Not tested   [x]  []Not tested    Radiculopathy  []  []Not tested   []  []Not tested    Paraspinal muscle tenderness  [] Paraspinal  []Midline   [] Paraspinal  []Midline   Sensation RIGHT  LEFT    L3  [x] Normal []Decreased    [x] Normal []Decreased   L4  [x] Normal  []Decreased   [x] Normal []Decreased   L5  [x] Normal []Decreased   [x] Normal []Decreased   S1  [x] Normal  []Decreased   [x] Normal []Decreased   Pelvis       Scoliosis  [x] Nml  [] Present Leg-length discrepency  [x] Equal  [] Right longer   [] Left longer   Range of Motion Active Passive Active Passive   Hip Flexion 120  120    Abduction 50  50    External Rotation @ 90 flex 65  65    Internal Rotation @ 90 flex 20  20           Hip Impingement / Dysplasia  [x] All Neg  [] Not tested   [x] All Neg  [] Not tested    Hip impingement test  []  []Not tested   []  []Not tested    C-sign  []  []Not tested   []  []Not tested    Anterior instability apprehension  []  []Not tested   []  []Not tested    Posterior instability apprehension  []  []Not tested   []  []Not tested    Uncontained Internal rotation  []  []Not tested  []  []Not tested          Abductors  [x] All Neg  [] Not tested   [] All Neg  [] Not tested    Medius strength  []  []Not tested   []  []Not tested    Minimum strength  []  []Not tested   []  []Not tested    IT band tendonitis  []  []Not tested   []  []Not tested    Trochanteric tenderness  []  []Not tested  [x]  []Not tested   Sciatic neuropathic pain  []  []Not tested   []  []Not tested           Post-arthroplasty  [] All Neg  [] Not tested   [] All Neg  [] Not tested    Rectus tendonitis  []  []Not tested   []  []Not tested    Iliopsoas tendonitis       Start-up pain  []  []Not tested   []  []Not tested      3+/5 strength with dorsiflexion of the left ankle against resistance  Imaging    Left Hip: [facility] [place]  Previous Radiographs: X-rays were ordered today and reviewed of the left hip.  3 views. AP pelvis, lateral, and false profile. They demonstrate no evidence of fractures or dislocations. Well-maintained joint space. Patient does have a moderate cam lesion present and a small osteophyte present femoral head. Impression moderate left hip osteoarthritis with DAVIDA. Anterior superior labral tear  Left hip capsulitis present    Procedure:  No orders of the defined types were placed in this encounter.       Assessment and Plan  Joe Gray was seen today for follow-up. Diagnoses and all orders for this visit:    Hip impingement syndrome, left    Primary osteoarthritis of left hip    Lumbar radiculopathy    Trochanteric bursitis of left hip      I discussed with Aurelia Gracesen that his history, symptoms, signs and imaging are most consistent with femoro-acetabular impingement. Hip OA , and lumbar radiculopathy    Patient does have hip impingement, bursitis, and osteoarthritis. He also has a concomitant lumbar radiculopathy. We have discussed repeating intra-articular injection but with the risks associated with it do not think it is worth it at this time. We have discussed possibly a lateral hip injection for bursitis at some point in the future. We have also briefly discussed hip arthroscopy versus total hip arthroplasty. This would depend on how much arthritis patient has when his symptoms get severe enough. We will see him back in 6 months or sooner if problems arise. We reviewed the natural history of these conditions and treatment options ranging from conservative measures (rest, icing, activity modification, physical therapy, pain meds, cortisone injection) to surgical options. In terms of treatment, I recommended continuing with rest, icing, avoidance of painful activities, NSAIDs or pain meds as tolerated, and physical therapy. We discussed surgical options as well, should conservative measures fail. Electronically signed by Erin Carballo PA-C on 8/9/2022 at 8:09 AM  This dictation was generated by voice recognition computer software. Although all attempts are made to edit the dictation for accuracy, there may be errors in the transcription that are not intended.

## 2022-11-04 DIAGNOSIS — I10 ESSENTIAL HYPERTENSION: ICD-10-CM

## 2022-11-04 DIAGNOSIS — E78.5 ELEVATED LIPIDS: ICD-10-CM

## 2022-11-04 RX ORDER — AMLODIPINE BESYLATE 10 MG/1
TABLET ORAL
Qty: 90 TABLET | Refills: 1 | Status: SHIPPED | OUTPATIENT
Start: 2022-11-04

## 2022-11-04 RX ORDER — NIACIN 500 MG/1
TABLET, EXTENDED RELEASE ORAL
Qty: 90 TABLET | Refills: 1 | Status: SHIPPED | OUTPATIENT
Start: 2022-11-04

## 2022-11-04 RX ORDER — DOXAZOSIN MESYLATE 4 MG/1
TABLET ORAL
Qty: 90 TABLET | Refills: 1 | Status: SHIPPED | OUTPATIENT
Start: 2022-11-04

## 2022-11-04 NOTE — TELEPHONE ENCOUNTER
Refill request for cardura, norvasc, niaspan medication.      [facility]      Last visit - 8/9/22     Pending visit - 1/23/23    Last refill -6/21/22,7/11/22      Medication Contract signed -   Last Oarrs ran-         Additional Comments

## 2023-01-07 ENCOUNTER — HOSPITAL ENCOUNTER (OUTPATIENT)
Age: 63
Discharge: HOME | End: 2023-01-07
Payer: COMMERCIAL

## 2023-01-07 DIAGNOSIS — E78.5 ELEVATED LIPIDS: ICD-10-CM

## 2023-01-07 DIAGNOSIS — I10 PRIMARY HYPERTENSION: ICD-10-CM

## 2023-01-07 LAB
A/G RATIO: 2.3 (ref 1.1–2.2)
ALBUMIN SERPL-MCNC: 4.5 G/DL (ref 3.4–5)
ALP BLD-CCNC: 72 U/L (ref 40–129)
ALT SERPL-CCNC: 22 U/L (ref 10–40)
ANION GAP SERPL CALCULATED.3IONS-SCNC: 10 MMOL/L (ref 3–16)
AST SERPL-CCNC: 23 U/L (ref 15–37)
BILIRUB SERPL-MCNC: 0.3 MG/DL (ref 0–1)
BUN BLDV-MCNC: 17 MG/DL (ref 7–20)
CALCIUM SERPL-MCNC: 9.2 MG/DL (ref 8.3–10.6)
CHLORIDE BLD-SCNC: 102 MMOL/L (ref 99–110)
CHOLESTEROL, TOTAL: 173 MG/DL (ref 0–199)
CO2: 27 MMOL/L (ref 21–32)
CREAT SERPL-MCNC: 1 MG/DL (ref 0.8–1.3)
GFR SERPL CREATININE-BSD FRML MDRD: >60 ML/MIN/{1.73_M2}
GLUCOSE BLD-MCNC: 101 MG/DL (ref 70–99)
HDLC SERPL-MCNC: 59 MG/DL (ref 40–60)
LDL CHOLESTEROL CALCULATED: 95 MG/DL
POTASSIUM SERPL-SCNC: 4.5 MMOL/L (ref 3.5–5.1)
SODIUM BLD-SCNC: 139 MMOL/L (ref 136–145)
TOTAL PROTEIN: 6.5 G/DL (ref 6.4–8.2)
TRIGL SERPL-MCNC: 94 MG/DL (ref 0–150)
VLDLC SERPL CALC-MCNC: 19 MG/DL

## 2023-01-07 PROCEDURE — 80053 COMPREHEN METABOLIC PANEL: CPT

## 2023-01-07 PROCEDURE — 80061 LIPID PANEL: CPT

## 2023-01-07 PROCEDURE — 36415 COLL VENOUS BLD VENIPUNCTURE: CPT

## 2023-01-09 DIAGNOSIS — D22.9 SKIN MOLE: Primary | ICD-10-CM

## 2023-01-10 ENCOUNTER — TELEPHONE (OUTPATIENT)
Dept: INTERNAL MEDICINE CLINIC | Age: 63
End: 2023-01-10

## 2023-01-10 NOTE — TELEPHONE ENCOUNTER
Called pt to relay message of dermatology referral. It is in [facility] and he can vandana.  The appointment at [phone]

## 2023-02-05 DIAGNOSIS — G47.00 INSOMNIA, UNSPECIFIED TYPE: ICD-10-CM

## 2023-02-05 DIAGNOSIS — G25.81 RLS (RESTLESS LEGS SYNDROME): ICD-10-CM

## 2023-02-06 RX ORDER — NORTRIPTYLINE HYDROCHLORIDE 25 MG/1
CAPSULE ORAL
Qty: 270 CAPSULE | Refills: 1 | Status: SHIPPED | OUTPATIENT
Start: 2023-02-06

## 2023-02-06 RX ORDER — GABAPENTIN 600 MG/1
TABLET ORAL
Qty: 90 TABLET | Refills: 1 | Status: SHIPPED | OUTPATIENT
Start: 2023-02-06 | End: 2023-05-06

## 2023-02-19 NOTE — PROGRESS NOTES
Kari Majano 65 y.o. male presents today for an Established patient for   Chief Complaint   Patient presents with    6 Month Follow-Up            ASSESSMENT/PLAN    1. Elevated lipids            From 1/7/2023. Total cholesterol 173. LDL cholesterol 95. At target. Continue monitor  - Lipid Panel; Future    2. Primary hypertension              BP: 136/82: Borderline elevated. Treat: New prescription: Hytrin 4 mg take 1 morning and 1/2 in the evening.  - doxazosin (CARDURA) 4 MG tablet; TAKE ONE TABLET BY  MOUTH Q AM  And  1/2 Q PM.FOR  HYPERTENSION AND PROSTATE  Dispense: 135 tablet; Refill: 1  - Comprehensive Metabolic Panel; Future    3. Obstructive sleep apnea syndrome           Baseline. 4. Irritable bowel syndrome, unspecified type                Stable. 5. RLS (restless legs syndrome)               Stable. 6. Irritable bladder              Follow-up with urology    7. Essential hypertension               Borderline elevated. Increase Hytrin  - doxazosin (CARDURA) 4 MG tablet; TAKE ONE TABLET BY  MOUTH Q AM  And  1/2 Q PM.FOR  HYPERTENSION AND PROSTATE  Dispense: 135 tablet; Refill: 1    8. Special screening for malignant neoplasm of prostate              Health maintenance. - PSA Screening; Future       Return in about 6 months (around 8/20/2023), or See DR NELSON, for HTN   Lipids. HPI:               Reviewed last office visit with me: 6/27/2022: Patient with HTN stable. Hyperlipidemia no recent testing. JUSTICE using mouthguard. GERD stable. Insomnia: Increase nortriptyline 25 mg take 2 at bedtime. IBS stable. ROS stable. Left hip pain follow-up with orthopedics. Low back pain follow-up with back surgeon. Reviewed Medicine and Allergies:  Reviewed Laboratory Data: 1/7/2023. Chemistry panel glucose 101. Otherwise unremarkable. Lipid panel: Total cholesterol: 173. LDL cholesterol 95. Reviewed Health Maintenance: Influenza vaccine. COVID-vaccine.   Reviewed: 8/9/2022 [facility] orthopedics

## 2023-02-20 ENCOUNTER — OFFICE VISIT (OUTPATIENT)
Dept: INTERNAL MEDICINE CLINIC | Age: 63
End: 2023-02-20
Payer: COMMERCIAL

## 2023-02-20 VITALS
OXYGEN SATURATION: 98 % | TEMPERATURE: 97.1 F | DIASTOLIC BLOOD PRESSURE: 82 MMHG | HEART RATE: 96 BPM | SYSTOLIC BLOOD PRESSURE: 136 MMHG

## 2023-02-20 DIAGNOSIS — I10 PRIMARY HYPERTENSION: ICD-10-CM

## 2023-02-20 DIAGNOSIS — K58.9 IRRITABLE BOWEL SYNDROME, UNSPECIFIED TYPE: ICD-10-CM

## 2023-02-20 DIAGNOSIS — N32.89 IRRITABLE BLADDER: ICD-10-CM

## 2023-02-20 DIAGNOSIS — G25.81 RLS (RESTLESS LEGS SYNDROME): ICD-10-CM

## 2023-02-20 DIAGNOSIS — E78.5 ELEVATED LIPIDS: Primary | ICD-10-CM

## 2023-02-20 DIAGNOSIS — Z12.5 SPECIAL SCREENING FOR MALIGNANT NEOPLASM OF PROSTATE: ICD-10-CM

## 2023-02-20 DIAGNOSIS — I10 ESSENTIAL HYPERTENSION: ICD-10-CM

## 2023-02-20 DIAGNOSIS — G47.33 OBSTRUCTIVE SLEEP APNEA SYNDROME: ICD-10-CM

## 2023-02-20 PROCEDURE — 3074F SYST BP LT 130 MM HG: CPT | Performed by: INTERNAL MEDICINE

## 2023-02-20 PROCEDURE — 99214 OFFICE O/P EST MOD 30 MIN: CPT | Performed by: INTERNAL MEDICINE

## 2023-02-20 PROCEDURE — 3078F DIAST BP <80 MM HG: CPT | Performed by: INTERNAL MEDICINE

## 2023-02-20 RX ORDER — DOXAZOSIN MESYLATE 4 MG/1
TABLET ORAL
Qty: 135 TABLET | Refills: 1 | Status: SHIPPED | OUTPATIENT
Start: 2023-02-20

## 2023-02-20 SDOH — ECONOMIC STABILITY: FOOD INSECURITY: WITHIN THE PAST 12 MONTHS, THE FOOD YOU BOUGHT JUST DIDN'T LAST AND YOU DIDN'T HAVE MONEY TO GET MORE.: NEVER TRUE

## 2023-02-20 SDOH — ECONOMIC STABILITY: HOUSING INSECURITY
IN THE LAST 12 MONTHS, WAS THERE A TIME WHEN YOU DID NOT HAVE A STEADY PLACE TO SLEEP OR SLEPT IN A SHELTER (INCLUDING NOW)?: NO

## 2023-02-20 SDOH — ECONOMIC STABILITY: INCOME INSECURITY: HOW HARD IS IT FOR YOU TO PAY FOR THE VERY BASICS LIKE FOOD, HOUSING, MEDICAL CARE, AND HEATING?: NOT HARD AT ALL

## 2023-02-20 SDOH — ECONOMIC STABILITY: FOOD INSECURITY: WITHIN THE PAST 12 MONTHS, YOU WORRIED THAT YOUR FOOD WOULD RUN OUT BEFORE YOU GOT MONEY TO BUY MORE.: NEVER TRUE

## 2023-02-20 ASSESSMENT — PATIENT HEALTH QUESTIONNAIRE - PHQ9
1. LITTLE INTEREST OR PLEASURE IN DOING THINGS: 0
SUM OF ALL RESPONSES TO PHQ QUESTIONS 1-9: 0
SUM OF ALL RESPONSES TO PHQ9 QUESTIONS 1 & 2: 0
2. FEELING DOWN, DEPRESSED OR HOPELESS: 0
SUM OF ALL RESPONSES TO PHQ QUESTIONS 1-9: 0

## 2023-04-28 DIAGNOSIS — E78.5 ELEVATED LIPIDS: ICD-10-CM

## 2023-04-28 DIAGNOSIS — I10 ESSENTIAL HYPERTENSION: ICD-10-CM

## 2023-04-28 RX ORDER — NIACIN 500 MG/1
TABLET, EXTENDED RELEASE ORAL
Qty: 90 TABLET | Refills: 3 | Status: SHIPPED | OUTPATIENT
Start: 2023-04-28

## 2023-04-28 RX ORDER — AMLODIPINE BESYLATE 10 MG/1
TABLET ORAL
Qty: 90 TABLET | Refills: 3 | Status: SHIPPED | OUTPATIENT
Start: 2023-04-28

## 2023-04-28 NOTE — TELEPHONE ENCOUNTER
Refill request for amLODIPine (NORVASC) 10 MG tablet, niacin (NIASPAN) 500 MG extended release tablet medication. Name of Pharmacy- [facility]      Last visit - 2/20/23     Pending visit - 5/2/23    Last refill - 11/4/22      Medication Contract signed - PDMP Monitoring:    Last PDMP Jinny Nina as Reviewed:  Review User Review Instant Review Result          [unfilled]  Urine Drug Screenings (1 yr)    No resulted procedures found.        Medication Contract and Consent for Opioid Use Documents Filed       Patient Documents       Type of Document Status Date Received Received By Description    Medication Contract Received 4/27/2018  9:53 AM Ryan Mayers MEDICATION CONTRACT 04/27/18                   Last Oarrs ran-         Additional Comments

## 2023-04-29 SDOH — HEALTH STABILITY: PHYSICAL HEALTH: ON AVERAGE, HOW MANY MINUTES DO YOU ENGAGE IN EXERCISE AT THIS LEVEL?: 40 MIN

## 2023-04-29 SDOH — HEALTH STABILITY: PHYSICAL HEALTH: ON AVERAGE, HOW MANY DAYS PER WEEK DO YOU ENGAGE IN MODERATE TO STRENUOUS EXERCISE (LIKE A BRISK WALK)?: 5 DAYS

## 2023-04-29 ASSESSMENT — SOCIAL DETERMINANTS OF HEALTH (SDOH)

## 2023-05-02 ENCOUNTER — OFFICE VISIT (OUTPATIENT)
Dept: FAMILY MEDICINE CLINIC | Age: 63
End: 2023-05-02
Payer: COMMERCIAL

## 2023-05-02 VITALS
HEART RATE: 93 BPM | BODY MASS INDEX: 29.66 KG/M2 | HEIGHT: 73 IN | DIASTOLIC BLOOD PRESSURE: 70 MMHG | SYSTOLIC BLOOD PRESSURE: 136 MMHG | OXYGEN SATURATION: 100 % | WEIGHT: 223.8 LBS

## 2023-05-02 DIAGNOSIS — G25.81 RLS (RESTLESS LEGS SYNDROME): Primary | ICD-10-CM

## 2023-05-02 DIAGNOSIS — R35.0 BENIGN PROSTATIC HYPERPLASIA WITH URINARY FREQUENCY: ICD-10-CM

## 2023-05-02 DIAGNOSIS — I10 PRIMARY HYPERTENSION: ICD-10-CM

## 2023-05-02 DIAGNOSIS — G47.00 INSOMNIA, UNSPECIFIED TYPE: ICD-10-CM

## 2023-05-02 DIAGNOSIS — N40.1 BENIGN PROSTATIC HYPERPLASIA WITH URINARY FREQUENCY: ICD-10-CM

## 2023-05-02 DIAGNOSIS — E78.5 ELEVATED LIPIDS: ICD-10-CM

## 2023-05-02 DIAGNOSIS — H93.19 UNILATERAL SUBJECTIVE NONPULSATILE TINNITUS WITHOUT HEARING LOSS, OTOSCOPIC FINDING, NEUROLOGIC DEFICIT, OR HEAD TRAUMA: ICD-10-CM

## 2023-05-02 PROCEDURE — 99214 OFFICE O/P EST MOD 30 MIN: CPT | Performed by: STUDENT IN AN ORGANIZED HEALTH CARE EDUCATION/TRAINING PROGRAM

## 2023-05-02 PROCEDURE — 3075F SYST BP GE 130 - 139MM HG: CPT | Performed by: STUDENT IN AN ORGANIZED HEALTH CARE EDUCATION/TRAINING PROGRAM

## 2023-05-02 PROCEDURE — 3078F DIAST BP <80 MM HG: CPT | Performed by: STUDENT IN AN ORGANIZED HEALTH CARE EDUCATION/TRAINING PROGRAM

## 2023-05-02 RX ORDER — NORTRIPTYLINE HYDROCHLORIDE 25 MG/1
CAPSULE ORAL
Qty: 270 CAPSULE | Refills: 1 | Status: SHIPPED | OUTPATIENT
Start: 2023-05-02

## 2023-05-02 RX ORDER — AMLODIPINE BESYLATE 10 MG/1
10 TABLET ORAL DAILY
Qty: 90 TABLET | Refills: 3 | Status: SHIPPED | OUTPATIENT
Start: 2023-05-02

## 2023-05-02 RX ORDER — GABAPENTIN 600 MG/1
300 TABLET ORAL DAILY
Qty: 45 TABLET | Refills: 1 | Status: SHIPPED | OUTPATIENT
Start: 2023-05-02 | End: 2023-07-31

## 2023-05-02 RX ORDER — NIACIN 500 MG/1
500 TABLET, EXTENDED RELEASE ORAL NIGHTLY
Qty: 90 TABLET | Refills: 3 | Status: SHIPPED | OUTPATIENT
Start: 2023-05-02

## 2023-05-02 RX ORDER — DOXAZOSIN MESYLATE 4 MG/1
TABLET ORAL
Qty: 135 TABLET | Refills: 1 | Status: SHIPPED | OUTPATIENT
Start: 2023-05-02

## 2023-05-02 NOTE — PROGRESS NOTES
2023    Manasa Justice (:  1960) is a 65 y.o. male, here for evaluation of the following medical concerns:  Chief Complaint   Patient presents with    New Patient    Hypertension       HPI    Hyperlipidemia    Hypertension  Norvasc 10 mg  Doxazosin 4 mg in a.m. and 2 mg in the evening  Reports that flomax caused increased urinary difficulty  Has been on this medication for 5-10 years  's/60's in AM, Evening 150's/90's sometimes. Checking after taking amlodipine     JUSTICE  Using mouthguard    RLS  Has some tingling in left leg  Hx of back surgery  Taking 1/2 tablet of 600mg nightly    Irritable bladder?/BPH? Up 1-2 times a night typically  No weak stream, no dribbling. Increased frequency during the day. 12-16oz fluid in a day plus coffee    Insomnia  Nortriptyline 50 mg nightly    Working on decreasing sweets. Walking 2 miles a day    Review of Systems  Tinnitus has worsened over last 6 months  Feels he is gaining weight  All other systems reviewed and negative    Prior to Visit Medications    Medication Sig Taking? Authorizing Provider   gabapentin (NEURONTIN) 600 MG tablet Take 0.5 tablets by mouth daily for 90 days. TAKE 1/2 TO 1 TABLET BY  MOUTH AT BEDTIME FOR NERVE  PAIN / SLEEP Yes Iraida Cooper DO   nortriptyline (PAMELOR) 25 MG capsule TAKE 1 TO 3 CAPSULES BY  MOUTH 1 OR 2 HOURS BEFORE  BEDTIME FOR SLEEP Yes Iraida Cooper DO   doxazosin (CARDURA) 4 MG tablet TAKE ONE TABLET BY  MOUTH Q AM  And  1/2 Q PM.FOR  HYPERTENSION AND PROSTATE Yes Iraida Cooper DO   amLODIPine (NORVASC) 10 MG tablet Take 1 tablet by mouth daily Yes Iraida Cooper DO   niacin (NIASPAN) 500 MG extended release tablet Take 1 tablet by mouth nightly Yes Iraida Cooper DO   Coenzyme Q10 (CO Q 10) 100 MG CAPS Take 1 tablet by mouth daily.  Yes Historical Provider, MD        Allergies   Allergen Reactions    Entex [Ami-Selvin] Other (See Comments)     Difficulty urinating  insomnia       Past Medical

## 2023-05-03 ENCOUNTER — PROCEDURE VISIT (OUTPATIENT)
Dept: AUDIOLOGY | Age: 63
End: 2023-05-03
Payer: COMMERCIAL

## 2023-05-03 DIAGNOSIS — H90.3 SENSORINEURAL HEARING LOSS, BILATERAL: Primary | ICD-10-CM

## 2023-05-03 DIAGNOSIS — H93.12 TINNITUS, LEFT: ICD-10-CM

## 2023-05-03 PROCEDURE — 92567 TYMPANOMETRY: CPT | Performed by: AUDIOLOGIST

## 2023-05-03 PROCEDURE — 92557 COMPREHENSIVE HEARING TEST: CPT | Performed by: AUDIOLOGIST

## 2023-05-03 NOTE — PROGRESS NOTES
Gege Moe   1960, 65 y.o. male   [id number]       Referring Provider: Josemanuel Denson DO   Referral Type: In an order in [address] [place] [address]    Reason for Visit: Evaluation of the cause of disorders of hearing, tinnitus, or balance. ADULT AUDIOLOGIC EVALUATION      Gege Moe is a 65 y.o. male seen today, 5/3/2023 , for an initial audiologic evaluation. Patient was alone. AUDIOLOGIC AND OTHER PERTINENT MEDICAL HISTORY:      Gege Moe noted tinnitus and history of occupational noise exposure. Patient reports constant left ear tinnitus for the past few months. 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 Gege Moe denied otalgia, aural fullness, dizziness, history of head trauma, history of ear surgery, and family history of hearing loss. Date: 5/3/2023     IMPRESSIONS:      Normal middle ear pressure and compliance, bilaterally. Slightly abnormal high frequency hearing sensitivity, left worse than right, which can coincide with patient symptoms. Word understanding was excellent presented at normal conversation level, bilaterally. Discussed tinnitus management strategies and to retest hearing every other year or sooner if changes are noted. Follow medical recommendations of Josemanuel Denson DO .     ASSESSMENT AND FINDINGS:     Otoscopy revealed: Clear ear canals bilaterally    RIGHT EAR:  Hearing Sensitivity: Normal hearing sensitivity to a mild sensorineural hearing loss from 250-8000 Hz  Speech Recognition Threshold: 5 dB HL  Word Recognition:Excellent (100%), based on NU-6 25-word list at 55 dBHL using recorded speech stimuli. Tympanometry: Normal peak pressure and compliance, Type A tympanogram, consistent with normal middle ear function.       LEFT EAR:  Hearing Sensitivity: Normal hearing sensitivity to a moderate sensorineural hearing loss from 250-8000 Hz  Speech Recognition Threshold: 10 dB HL  Word Recognition:

## 2023-05-11 DIAGNOSIS — G25.81 RLS (RESTLESS LEGS SYNDROME): ICD-10-CM

## 2023-05-11 RX ORDER — GABAPENTIN 600 MG/1
300 TABLET ORAL NIGHTLY
Qty: 45 TABLET | Refills: 1 | Status: SHIPPED | OUTPATIENT
Start: 2023-05-11 | End: 2023-08-09

## 2023-05-11 NOTE — TELEPHONE ENCOUNTER
[facility] needs an updated script for Gabapentin. The orginial script on 5/2/23 states 1/2 tab daily and 1/2 to 1 tab by mouth at bedtime. Script will not be fill since it has two different directions on it. Please sign new script with correct dosing. Thanks.

## 2023-07-17 ENCOUNTER — PATIENT MESSAGE (OUTPATIENT)
Dept: FAMILY MEDICINE CLINIC | Age: 63
End: 2023-07-17

## 2023-07-17 NOTE — TELEPHONE ENCOUNTER
From: Mary Grace Workman  To: Dr. Sabrina Soliman: 7/17/2023 4:59 PM EDT  Subject: Dermatologist     Do you have any pull to get a [facility] dermatologist to take me as a patient? I have lost my second [facility] dermatologist, rae Cox and now Catia Carroll. [facility] and [facility] are not taking new patients. I would like to follow up on treatment and monitoring after my last visit.     Thanks for your help  Mike Castaneda

## 2023-07-18 NOTE — TELEPHONE ENCOUNTER
The only other dermatologist nearby would be Dr. Janice Sánchez who is with dermatologist of [facility]. He could check with insurance to see if this is covered. Otherwise I recommend checking with insurance to see what other dermatologist are covered on his insurance plan and I would be happy to place referral.  Our [facility] dermatologist only performs surgery and he does not require follow-up Mohs surgery for his actinic keratosis from previous biopsy.

## 2023-10-25 DIAGNOSIS — G47.00 INSOMNIA, UNSPECIFIED TYPE: ICD-10-CM

## 2023-10-25 RX ORDER — NORTRIPTYLINE HYDROCHLORIDE 25 MG/1
CAPSULE ORAL
Qty: 270 CAPSULE | Refills: 1 | Status: SHIPPED | OUTPATIENT
Start: 2023-10-25

## 2023-10-25 NOTE — TELEPHONE ENCOUNTER
Refill Request     CONFIRM preferred pharmacy with the patient. If Mail Order Rx - Pend for 90 day refill. Last Seen: Last Seen Department: 5/2/2023  Last Seen by PCP: 5/2/2023    Last Written: 5/2/2023    If no future appointment scheduled:  Review the last OV with PCP and review information for follow-up visit,  Route STAFF MESSAGE with patient name to the [facility] for scheduling with the following information:            -  Timing of next visit           -  Visit type ie Physical, OV, etc           -  Diagnoses/Reason ie. COPD, HTN - Do not use MEDICATION, Follow-up or CHECK UP - Give reason for visit      Next Appointment:   Future Appointments   Date Time Provider [address]   11/7/2023  8:30 AM Lea Cooper DO [facility]  [place] [facility] [place]       Message sent to [address] [place] [address] to schedule appt with patient?   NO      Requested Prescriptions     Pending Prescriptions Disp Refills    nortriptyline (PAMELOR) 25 MG capsule [Pharmacy Med Name: NORTRIPTYLINE  25MG  CAP] 270 capsule 1     Sig: TAKE 1 TO 3 CAPSULES BY MOUTH 1  OR 2 HOURS BEFORE BEDTIME FOR  SLEEP

## 2023-11-20 DIAGNOSIS — E78.5 ELEVATED LIPIDS: ICD-10-CM

## 2023-11-20 DIAGNOSIS — I10 PRIMARY HYPERTENSION: ICD-10-CM

## 2023-11-20 DIAGNOSIS — Z12.5 SPECIAL SCREENING FOR MALIGNANT NEOPLASM OF PROSTATE: ICD-10-CM

## 2023-11-21 LAB
ALBUMIN SERPL-MCNC: 4.3 G/DL (ref 3.4–5)
ALBUMIN/GLOB SERPL: 2.2 {RATIO} (ref 1.1–2.2)
ALP SERPL-CCNC: 80 U/L (ref 40–129)
ALT SERPL-CCNC: 26 U/L (ref 10–40)
ANION GAP SERPL CALCULATED.3IONS-SCNC: 8 MMOL/L (ref 3–16)
AST SERPL-CCNC: 32 U/L (ref 15–37)
BILIRUB SERPL-MCNC: 0.3 MG/DL (ref 0–1)
BUN SERPL-MCNC: 19 MG/DL (ref 7–20)
CALCIUM SERPL-MCNC: 8.7 MG/DL (ref 8.3–10.6)
CHLORIDE SERPL-SCNC: 102 MMOL/L (ref 99–110)
CHOLEST SERPL-MCNC: 158 MG/DL (ref 0–199)
CO2 SERPL-SCNC: 28 MMOL/L (ref 21–32)
CREAT SERPL-MCNC: 1.2 MG/DL (ref 0.8–1.3)
GFR SERPLBLD CREATININE-BSD FMLA CKD-EPI: >60 ML/MIN/{1.73_M2}
GLUCOSE SERPL-MCNC: 102 MG/DL (ref 70–99)
HDLC SERPL-MCNC: 51 MG/DL (ref 40–60)
LDLC SERPL CALC-MCNC: 90 MG/DL
POTASSIUM SERPL-SCNC: 4 MMOL/L (ref 3.5–5.1)
PROT SERPL-MCNC: 6.3 G/DL (ref 6.4–8.2)
PSA SERPL DL<=0.01 NG/ML-MCNC: 1.12 NG/ML (ref 0–4)
SODIUM SERPL-SCNC: 138 MMOL/L (ref 136–145)
TRIGL SERPL-MCNC: 85 MG/DL (ref 0–150)
VLDLC SERPL CALC-MCNC: 17 MG/DL

## 2023-11-22 ENCOUNTER — OFFICE VISIT (OUTPATIENT)
Dept: FAMILY MEDICINE CLINIC | Age: 63
End: 2023-11-22
Payer: COMMERCIAL

## 2023-11-22 VITALS
DIASTOLIC BLOOD PRESSURE: 68 MMHG | HEART RATE: 98 BPM | SYSTOLIC BLOOD PRESSURE: 132 MMHG | WEIGHT: 226.2 LBS | BODY MASS INDEX: 29.84 KG/M2 | OXYGEN SATURATION: 97 %

## 2023-11-22 DIAGNOSIS — N40.1 BENIGN PROSTATIC HYPERPLASIA WITH URINARY FREQUENCY: ICD-10-CM

## 2023-11-22 DIAGNOSIS — G47.00 INSOMNIA, UNSPECIFIED TYPE: ICD-10-CM

## 2023-11-22 DIAGNOSIS — Z12.83 SCREENING FOR SKIN CANCER: Primary | ICD-10-CM

## 2023-11-22 DIAGNOSIS — Z23 NEED FOR INFLUENZA VACCINATION: ICD-10-CM

## 2023-11-22 DIAGNOSIS — G25.81 RLS (RESTLESS LEGS SYNDROME): ICD-10-CM

## 2023-11-22 DIAGNOSIS — R35.0 BENIGN PROSTATIC HYPERPLASIA WITH URINARY FREQUENCY: ICD-10-CM

## 2023-11-22 DIAGNOSIS — R73.09 ELEVATED GLUCOSE LEVEL: ICD-10-CM

## 2023-11-22 DIAGNOSIS — G47.33 OBSTRUCTIVE SLEEP APNEA SYNDROME: ICD-10-CM

## 2023-11-22 LAB — HBA1C MFR BLD: 5.5 %

## 2023-11-22 PROCEDURE — 90674 CCIIV4 VAC NO PRSV 0.5 ML IM: CPT | Performed by: STUDENT IN AN ORGANIZED HEALTH CARE EDUCATION/TRAINING PROGRAM

## 2023-11-22 PROCEDURE — 3078F DIAST BP <80 MM HG: CPT | Performed by: STUDENT IN AN ORGANIZED HEALTH CARE EDUCATION/TRAINING PROGRAM

## 2023-11-22 PROCEDURE — 83036 HEMOGLOBIN GLYCOSYLATED A1C: CPT | Performed by: STUDENT IN AN ORGANIZED HEALTH CARE EDUCATION/TRAINING PROGRAM

## 2023-11-22 PROCEDURE — 3074F SYST BP LT 130 MM HG: CPT | Performed by: STUDENT IN AN ORGANIZED HEALTH CARE EDUCATION/TRAINING PROGRAM

## 2023-11-22 PROCEDURE — 90471 IMMUNIZATION ADMIN: CPT | Performed by: STUDENT IN AN ORGANIZED HEALTH CARE EDUCATION/TRAINING PROGRAM

## 2023-11-22 PROCEDURE — 99214 OFFICE O/P EST MOD 30 MIN: CPT | Performed by: STUDENT IN AN ORGANIZED HEALTH CARE EDUCATION/TRAINING PROGRAM

## 2023-11-22 ASSESSMENT — PATIENT HEALTH QUESTIONNAIRE - PHQ9
SUM OF ALL RESPONSES TO PHQ QUESTIONS 1-9: 2
SUM OF ALL RESPONSES TO PHQ QUESTIONS 1-9: 2
10. IF YOU CHECKED OFF ANY PROBLEMS, HOW DIFFICULT HAVE THESE PROBLEMS MADE IT FOR YOU TO DO YOUR WORK, TAKE CARE OF THINGS AT HOME, OR GET ALONG WITH OTHER PEOPLE: 1
SUM OF ALL RESPONSES TO PHQ QUESTIONS 1-9: 2
4. FEELING TIRED OR HAVING LITTLE ENERGY: 0
8. MOVING OR SPEAKING SO SLOWLY THAT OTHER PEOPLE COULD HAVE NOTICED. OR THE OPPOSITE, BEING SO FIGETY OR RESTLESS THAT YOU HAVE BEEN MOVING AROUND A LOT MORE THAN USUAL: 0
3. TROUBLE FALLING OR STAYING ASLEEP: 1
7. TROUBLE CONCENTRATING ON THINGS, SUCH AS READING THE NEWSPAPER OR WATCHING TELEVISION: 1
SUM OF ALL RESPONSES TO PHQ9 QUESTIONS 1 & 2: 0
9. THOUGHTS THAT YOU WOULD BE BETTER OFF DEAD, OR OF HURTING YOURSELF: 0
2. FEELING DOWN, DEPRESSED OR HOPELESS: 0
1. LITTLE INTEREST OR PLEASURE IN DOING THINGS: 0
SUM OF ALL RESPONSES TO PHQ QUESTIONS 1-9: 2
5. POOR APPETITE OR OVEREATING: 0
6. FEELING BAD ABOUT YOURSELF - OR THAT YOU ARE A FAILURE OR HAVE LET YOURSELF OR YOUR FAMILY DOWN: 0

## 2023-11-22 ASSESSMENT — ANXIETY QUESTIONNAIRES
1. FEELING NERVOUS, ANXIOUS, OR ON EDGE: 0
7. FEELING AFRAID AS IF SOMETHING AWFUL MIGHT HAPPEN: 0
6. BECOMING EASILY ANNOYED OR IRRITABLE: 0
5. BEING SO RESTLESS THAT IT IS HARD TO SIT STILL: 0
3. WORRYING TOO MUCH ABOUT DIFFERENT THINGS: 0
IF YOU CHECKED OFF ANY PROBLEMS ON THIS QUESTIONNAIRE, HOW DIFFICULT HAVE THESE PROBLEMS MADE IT FOR YOU TO DO YOUR WORK, TAKE CARE OF THINGS AT HOME, OR GET ALONG WITH OTHER PEOPLE: NOT DIFFICULT AT ALL
4. TROUBLE RELAXING: 0
2. NOT BEING ABLE TO STOP OR CONTROL WORRYING: 0
GAD7 TOTAL SCORE: 0

## 2023-11-22 NOTE — PROGRESS NOTES
Assessment:  Encounter Diagnoses   Name Primary? Screening for skin cancer Yes    Need for influenza vaccination     Elevated glucose level     RLS (restless legs syndrome)     Obstructive sleep apnea syndrome     Insomnia, unspecified type     Benign prostatic hyperplasia with urinary frequency        Plan:  1. Screening for skin cancer  -     [facility] - Nam Kramer DO, Dermatology, [facility]  2. Need for influenza vaccination  -     Influenza, FLUCELVAX, (age 10 mo+), IM, Preservative Free, 0.5 mL  3. Elevated glucose level  -     POCT glycosylated hemoglobin (Hb A1C)  4. RLS (restless legs syndrome)  Assessment & Plan:  Controlled with gabapentin 300 mg nightly. No adverse effects of medication  5. Obstructive sleep apnea syndrome  Assessment & Plan:  Currently using mouthguard  6. Insomnia, unspecified type  Assessment & Plan:  Currently using nortriptyline 25 to 75 mg nightly. 7. Benign prostatic hyperplasia with urinary frequency  Assessment & Plan:  Discussed lifestyle modifications to see if this improves frequency of symptoms. If not improving then would recommend trial of medication. Discussed starting buspar for anxiety. Discussed risks/benefits. Will let me know if he is interested    No follow-ups on file. Patient: Keanu Randolph is a 59 y.o. male who presents today with the following Chief Complaint(s):  Chief Complaint   Patient presents with    6 Month Follow-Up     Referral to derm - previous dermatologist has left cannot find new provider    Sleep Problem     Trouble staying asleep         Sleep Problem        Hypertension  Norvasc 10 mg  Doxazosin 4 mg in a.m. and 2 mg in the evening  Reports that flomax caused increased urinary difficulty  Has been on this medication for 5-10 years  's/60's in AM, Evening 150's/90's sometimes.  Checking after taking amlodipine      JUSTICE  Using mouthguard     RLS  Has some tingling in left leg  Hx of back surgery  Taking 1/2 tablet of

## 2023-11-27 PROBLEM — Z23 FLU VACCINE NEED: Status: RESOLVED | Noted: 2021-10-12 | Resolved: 2023-11-27

## 2023-11-27 NOTE — ASSESSMENT & PLAN NOTE
Discussed lifestyle modifications to see if this improves frequency of symptoms. If not improving then would recommend trial of medication.

## 2024-05-08 SDOH — HEALTH STABILITY: PHYSICAL HEALTH: ON AVERAGE, HOW MANY DAYS PER WEEK DO YOU ENGAGE IN MODERATE TO STRENUOUS EXERCISE (LIKE A BRISK WALK)?: 5 DAYS

## 2024-05-08 SDOH — HEALTH STABILITY: PHYSICAL HEALTH: ON AVERAGE, HOW MANY MINUTES DO YOU ENGAGE IN EXERCISE AT THIS LEVEL?: 40 MIN

## 2024-05-09 ENCOUNTER — TELEPHONE (OUTPATIENT)
Dept: ORTHOPEDIC SURGERY | Age: 64
End: 2024-05-09

## 2024-05-10 ENCOUNTER — OFFICE VISIT (OUTPATIENT)
Dept: ORTHOPEDIC SURGERY | Age: 64
End: 2024-05-10

## 2024-05-10 DIAGNOSIS — S82.62XA CLOSED AVULSION FRACTURE OF LATERAL MALLEOLUS OF LEFT FIBULA, INITIAL ENCOUNTER: Primary | ICD-10-CM

## 2024-05-10 DIAGNOSIS — M25.571 RIGHT ANKLE PAIN, UNSPECIFIED CHRONICITY: ICD-10-CM

## 2024-05-11 PROBLEM — S82.62XA CLOSED AVULSION FRACTURE OF LATERAL MALLEOLUS OF LEFT FIBULA: Status: ACTIVE | Noted: 2024-05-11

## 2024-06-02 DIAGNOSIS — G25.81 RLS (RESTLESS LEGS SYNDROME): ICD-10-CM

## 2024-06-02 DIAGNOSIS — E78.5 ELEVATED LIPIDS: ICD-10-CM

## 2024-06-03 RX ORDER — AMLODIPINE BESYLATE 10 MG/1
10 TABLET ORAL DAILY
Qty: 90 TABLET | Refills: 3 | Status: SHIPPED | OUTPATIENT
Start: 2024-06-03

## 2024-06-03 RX ORDER — NIACIN 500 MG/1
500 TABLET, EXTENDED RELEASE ORAL NIGHTLY
Qty: 90 TABLET | Refills: 3 | Status: SHIPPED | OUTPATIENT
Start: 2024-06-03

## 2024-06-03 RX ORDER — GABAPENTIN 600 MG/1
300 TABLET ORAL NIGHTLY
Qty: 45 TABLET | Refills: 1 | Status: SHIPPED | OUTPATIENT
Start: 2024-06-03 | End: 2024-11-30

## 2024-06-03 NOTE — TELEPHONE ENCOUNTER
Refill Request     CONFIRM preferred pharmacy with the patient.    If Mail Order Rx - Pend for 90 day refill.      Last Seen: Last Seen Department: 11/22/2023  Last Seen by PCP: 11/22/2023    Last Written:   Amlodipine-5/2/23 90 tab 3 refills  Niacin-5/2/23 90 tab 3 refills  Gabapentin-5/11/23 45 tab 1 refills    If no future appointment scheduled:  Review the last OV with PCP and review information for follow-up visit,  Route STAFF MESSAGE with patient name to the  Pool for scheduling with the following information:            -  Timing of next visit           -  Visit type ie Physical, OV, etc           -  Diagnoses/Reason ie. COPD, HTN - Do not use MEDICATION, Follow-up or CHECK UP - Give reason for visit      Next Appointment:   Future Appointments   Date Time Provider Department Center   6/25/2024  8:00 AM Obie Smallwood MD [facility]   11/25/2024  8:00 AM Nabeel Cooper DO [facility]  [facility] [place]       Message sent to  to schedule appt with patient?  NO      Requested Prescriptions     Pending Prescriptions Disp Refills    amLODIPine (NORVASC) 10 MG tablet 90 tablet 3     Sig: Take 1 tablet by mouth daily    niacin (NIASPAN) 500 MG extended release tablet 90 tablet 3     Sig: Take 1 tablet by mouth nightly    gabapentin (NEURONTIN) 600 MG tablet 45 tablet 1     Sig: Take 0.5 tablets by mouth at bedtime for 90 days.

## 2024-06-25 ENCOUNTER — OFFICE VISIT (OUTPATIENT)
Dept: ORTHOPEDIC SURGERY | Age: 64
End: 2024-06-25
Payer: COMMERCIAL

## 2024-06-25 DIAGNOSIS — S82.62XA CLOSED AVULSION FRACTURE OF LATERAL MALLEOLUS OF LEFT FIBULA, INITIAL ENCOUNTER: ICD-10-CM

## 2024-06-25 DIAGNOSIS — M76.71 PERONEAL TENDONITIS, RIGHT: Primary | ICD-10-CM

## 2024-06-25 PROCEDURE — 99214 OFFICE O/P EST MOD 30 MIN: CPT | Performed by: ORTHOPAEDIC SURGERY

## 2024-06-25 NOTE — PROGRESS NOTES
CHIEF COMPLAINT:   1- Right ankle pain / lateral malleolus avulsion fracture.  2- Right Peroneal tendonitis.    DATE OF INJURY: 3/24/2024    HISTORY:  Mr. Mims 64 y.o.  male presents today for f/u evaluation of a right ankle injury which occurred when he was stepping off front step at home and twisted his left ankle.  He was first seen and evaluated by [facility], where he was x-rayed, splinted. He is complaining of lateral ankle pain and swelling 1/10. This is better with elevation and worse with bearing wt. The pain is sharp and not radiating. No numbness or tingling sensation. Alleviating factors: rest. No other complaint.     Past Medical History:   Diagnosis Date    Allergic rhinitis     Cancer (HCC)     Chronic back pain     Chronic sinus infection     GERD (gastroesophageal reflux disease)     Hyperlipidemia     Hypertension     IBS (irritable bowel syndrome)     Insomnia     Irritable bladder     Restless legs syndrome 2015    RLS (restless legs syndrome) 09/21/2012    Sleep apnea     uses mouthguard    Wears glasses        Past Surgical History:   Procedure Laterality Date    CATARACT REMOVAL WITH IMPLANT Right     COLONOSCOPY  12/2003    normal    COLONOSCOPY  10/24/14    NL Redo 10 yrs    CYST REMOVAL  1995    sebacous cyst head    EYE SURGERY Left 9/16/14    Phaco emulsification with IOL implant    SPINE SURGERY  2001    L5-S1 discectomy    SPINE SURGERY  2010    L4-L5 discectomy    TONSILLECTOMY AND ADENOIDECTOMY  1971       Social History     Socioeconomic History    Marital status:      Spouse name: Not on file    Number of children: Not on file    Years of education: Not on file    Highest education level: Not on file   Occupational History    Not on file   Tobacco Use    Smoking status: Never    Smokeless tobacco: Never   Substance and Sexual Activity    Alcohol use: Yes     Alcohol/week: 2.0 standard drinks of alcohol     Types: 1 Cans of beer, 1 Drinks containing 0.5 oz of

## 2024-07-02 ENCOUNTER — HOSPITAL ENCOUNTER (OUTPATIENT)
Dept: PHYSICAL THERAPY | Age: 64
Setting detail: THERAPIES SERIES
Discharge: HOME | End: 2024-07-02
Payer: COMMERCIAL

## 2024-07-02 PROCEDURE — 97110 THERAPEUTIC EXERCISES: CPT | Performed by: PHYSICAL THERAPIST

## 2024-07-02 PROCEDURE — 97161 PT EVAL LOW COMPLEX 20 MIN: CPT | Performed by: PHYSICAL THERAPIST

## 2024-07-02 NOTE — PLAN OF CARE
throughout without pain to allow for proper functional mobility to enable patient to return to working full day without pain/ weakeness.   [] Progressing: [] Met: [] Not Met: [] Adjusted  4. Patient will return to walking for exercise without increased symptoms or restriction.   [] Progressing: [] Met: [] Not Met: [] Adjusted  5. Able to complete yard work without pain or loss of balance  [] Progressing: [] Met: [] Not Met: [] Adjusted     Overall Progression Towards Functional goals/ Treatment Progress Update:  [] Patient is progressing as expected towards functional goals listed.    [] Progression is slowed due to complexities/Impairments listed.  [] Progression has been slowed due to co-morbidities.  [x] Plan just implemented, too soon (<30days) to assess goals progression   [] Goals require adjustment due to lack of progress  [] Patient is not progressing as expected and requires additional follow up with physician  [] Other:     TREATMENT PLAN     Frequency/Duration: 1-2x/week for 6 weeks for the following treatment interventions:    Interventions:  Therapeutic Exercise (03888) including: strength training, ROM, and functional mobility  Neuromuscular Re-education (96710) activation and proprioception, including postural re-education.    Manual Therapy (40245) as indicated to include: Passive Range of Motion, Gr I-IV mobilizations, and Soft Tissue Mobilization  Modalities as needed that may include: Vasoneumatic Compression  Patient education on joint protection, postural re-education, activity modification, and progression of HEP    Plan: POC initiated as per evaluation    Electronically Signed by Elvira Garner PT  Date: 07/02/2024     Note: Portions of this note have been templated and/or copied from initial evaluation, reassessments and prior notes for documentation efficiency.    Note: If patient does not return for scheduled/recommended follow up visits, this note will serve as a discharge from care

## 2024-07-09 ENCOUNTER — HOSPITAL ENCOUNTER (OUTPATIENT)
Dept: PHYSICAL THERAPY | Age: 64
Setting detail: THERAPIES SERIES
Discharge: HOME | End: 2024-07-09
Payer: COMMERCIAL

## 2024-07-09 PROCEDURE — 97110 THERAPEUTIC EXERCISES: CPT | Performed by: PHYSICAL THERAPIST

## 2024-07-09 PROCEDURE — 97140 MANUAL THERAPY 1/> REGIONS: CPT | Performed by: PHYSICAL THERAPIST

## 2024-07-09 NOTE — FLOWSHEET NOTE
[facility]- [facility]patient [facility] Therapy  [address], [place] [address] office: [phone] fax: [phone]     Physical Therapy Initial Evaluation Certification      Dear Obie Smallwood MD,    We had the pleasure of evaluating the following patient for physical therapy services at [facility].  A summary of our findings can be found in the initial assessment below.  This includes our plan of care.  If you have any questions or concerns regarding these findings, please do not hesitate to contact me at the office phone number listed above.  Thank you for the referral.     Physician Signature:_______________________________Date:__________________  By signing above (or electronic signature), therapist’s plan is approved by physician       Physical Therapy: TREATMENT/PROGRESS NOTE   Patient: Fabrizio Mims (64 y.o. male)   Examination Date: 2024   :  1960 MRN: [id number]   Visit #: 2   Insurance Allowable Auth Needed   67/33%    BMN []Yes    [x]No    Insurance: Payor: BCBS / Plan: BCBS OUT OF STATE / Product Type: *No Product type* /   Insurance ID: [id number] - ([place] BC)  Secondary Insurance (if applicable):    Treatment Diagnosis:   Right ankle pain M25.571   Medical Diagnosis:  Peroneal tendonitis, right [M76.71]   Referring Physician: Obie Smallwood MD  PCP: Nabeel Cooper DO     Plan of care signed (Y/N):     Date of Patient follow up with Physician:      Progress Report/POC: NO  POC update due: (10 visits /OR AUTH LIMITS, whichever is less)  2024                                             Precautions/ Contra-indications:           Latex allergy:  NO  Pacemaker:    NO  Contraindications for Manipulation: None  Date of Surgery:   Other:  HBP,  CA    Red Flags:  None    C-SSRS Triggered by Intake questionnaire:   Patient answered 'NO' to both behavioral questions on intake.  No further screening

## 2024-07-12 ENCOUNTER — HOSPITAL ENCOUNTER (OUTPATIENT)
Dept: PHYSICAL THERAPY | Age: 64
Setting detail: THERAPIES SERIES
Discharge: HOME | End: 2024-07-12
Payer: COMMERCIAL

## 2024-07-12 PROCEDURE — 97140 MANUAL THERAPY 1/> REGIONS: CPT | Performed by: PHYSICAL THERAPIST

## 2024-07-12 PROCEDURE — 97110 THERAPEUTIC EXERCISES: CPT | Performed by: PHYSICAL THERAPIST

## 2024-07-12 NOTE — FLOWSHEET NOTE
[facility]- [facility]patient [facility] Therapy  [address], [place] [address] office: [phone] fax: [phone]     Physical Therapy Initial Evaluation Certification      Dear Obie Smallwood MD,    We had the pleasure of evaluating the following patient for physical therapy services at [facility].  A summary of our findings can be found in the initial assessment below.  This includes our plan of care.  If you have any questions or concerns regarding these findings, please do not hesitate to contact me at the office phone number listed above.  Thank you for the referral.     Physician Signature:_______________________________Date:__________________  By signing above (or electronic signature), therapist’s plan is approved by physician       Physical Therapy: TREATMENT/PROGRESS NOTE   Patient: Fabrizio Mims (64 y.o. male)   Examination Date: 2024   :  1960 MRN: [id number]   Visit #: 3   Insurance Allowable Auth Needed   67/33%    BMN []Yes    [x]No    Insurance: Payor: BCBS / Plan: BCBS OUT OF STATE / Product Type: *No Product type* /   Insurance ID: [id number] - ([place] BC)  Secondary Insurance (if applicable):    Treatment Diagnosis:   Right ankle pain M25.571   Medical Diagnosis:  Peroneal tendonitis, right [M76.71]   Referring Physician: Obie Smallwood MD  PCP: Nabeel Cooper DO     Plan of care signed (Y/N):     Date of Patient follow up with Physician:      Progress Report/POC: NO  POC update due: (10 visits /OR AUTH LIMITS, whichever is less)  2024                                             Precautions/ Contra-indications:           Latex allergy:  NO  Pacemaker:    NO  Contraindications for Manipulation: None  Date of Surgery:   Other:  HBP,  CA    Red Flags:  None    C-SSRS Triggered by Intake questionnaire:   Patient answered 'NO' to both behavioral questions on intake.  No further screening

## 2024-07-15 ENCOUNTER — HOSPITAL ENCOUNTER (OUTPATIENT)
Dept: PHYSICAL THERAPY | Age: 64
Setting detail: THERAPIES SERIES
Discharge: HOME | End: 2024-07-15
Payer: COMMERCIAL

## 2024-07-15 PROCEDURE — 97140 MANUAL THERAPY 1/> REGIONS: CPT | Performed by: PHYSICAL THERAPIST

## 2024-07-15 PROCEDURE — 97110 THERAPEUTIC EXERCISES: CPT | Performed by: PHYSICAL THERAPIST

## 2024-07-15 NOTE — FLOWSHEET NOTE
[facility]  [address], [place] [address] office: [phone] fax: [phone]     Physical Therapy Initial Evaluation Certification      Dear Obie Smallwood MD,    We had the pleasure of evaluating the following patient for physical therapy services at [facility].  A summary of our findings can be found in the initial assessment below.  This includes our plan of care.  If you have any questions or concerns regarding these findings, please do not hesitate to contact me at the office phone number listed above.  Thank you for the referral.     Physician Signature:_______________________________Date:__________________  By signing above (or electronic signature), therapist’s plan is approved by physician       Physical Therapy: TREATMENT/PROGRESS NOTE   Patient: Fabrizio Mims (64 y.o. male)   Examination Date: 07/15/2024   :  1960 MRN: [id number]   Visit #: 4   Insurance Allowable Auth Needed   67/33%    BMN []Yes    [x]No    Insurance: Payor: BCBS / Plan: BCBS OUT OF STATE / Product Type: *No Product type* /   Insurance ID: [id number] - ([place] BC)  Secondary Insurance (if applicable):    Treatment Diagnosis:   Right ankle pain M25.571   Medical Diagnosis:  Peroneal tendonitis, right [M76.71]   Referring Physician: Obie Smallwood MD  PCP: Nabeel Cooper DO     Plan of care signed (Y/N):     Date of Patient follow up with Physician:      Progress Report/POC: NO  POC update due: (10 visits /OR AUTH LIMITS, whichever is less)  2024                                             Precautions/ Contra-indications:           Latex allergy:  NO  Pacemaker:    NO  Contraindications for Manipulation: None  Date of Surgery:   Other:  HBP,  CA    Red Flags:  None    C-SSRS Triggered by Intake questionnaire:   Patient answered 'NO' to both behavioral questions on intake.  No further screening

## 2024-07-18 ENCOUNTER — HOSPITAL ENCOUNTER (OUTPATIENT)
Dept: PHYSICAL THERAPY | Age: 64
Setting detail: THERAPIES SERIES
Discharge: HOME | End: 2024-07-18
Payer: COMMERCIAL

## 2024-07-18 PROCEDURE — 97140 MANUAL THERAPY 1/> REGIONS: CPT | Performed by: PHYSICAL THERAPIST

## 2024-07-18 PROCEDURE — 97110 THERAPEUTIC EXERCISES: CPT | Performed by: PHYSICAL THERAPIST

## 2024-07-18 NOTE — FLOWSHEET NOTE
warranted    Preferred Language for Healthcare:   [x] English       [] other:    SUBJECTIVE EXAMINATION     Patient stated complaint:  Pt was sore after last viist, but overall, the ankle is feeling better.     Eval:  Last weekend of March, Pt was blowing leaves, and he fell off porch. Pt was able to get up indep.  He went to [facility], and they thought it was a sprain.  Week later, he got second opinion.  Fracture was found.  Pt was in boot.  Pt wore boot or steel toed shoes at work.  Pt is still experiencing pain.   He has graduated from boot.  Fractured healed.  Pt has not c/o N/T.   Pt sleeps well.  Pt takes meds prn.  Pt has some discomfort with stairs, and he has them at home and work.  Pt has swelling by end of day.   Pt has mild discomfort walking on level floor.  Pt has significant pain with walking on grass/ uneven.  Ankle feels unstable.   Pt has orthotics.  No previous ankle sprains.         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       OBJECTIVE EXAMINATION        Mvmt (norm) AROM R  7/2/24 Current      ANKLE DF (20) 10     PF (50) 30     Inversion (30) 26     Eversion (20) 0    Circ  28.0 cm.          MMT R  7/2/24 current   KNEE  Flexion       Extension       ANKLE  DF 5      PF       Inversion 4+      Eversion 4      Exercises/Interventions     Therapeutic Ex (70987)  resistance Sets/time Reps Notes/Cues/Progressions       TB ankle 4 WAYS BL X 20 EV HEP      Kneeling PF s :20 3x     Gastroc s 3D :30 3x    SB seated front leg in

## 2024-07-30 ENCOUNTER — HOSPITAL ENCOUNTER (OUTPATIENT)
Dept: PHYSICAL THERAPY | Age: 64
Setting detail: THERAPIES SERIES
Discharge: HOME | End: 2024-07-30
Payer: COMMERCIAL

## 2024-07-30 PROCEDURE — 97110 THERAPEUTIC EXERCISES: CPT | Performed by: PHYSICAL THERAPIST

## 2024-07-30 PROCEDURE — 97112 NEUROMUSCULAR REEDUCATION: CPT | Performed by: PHYSICAL THERAPIST

## 2024-07-30 NOTE — FLOWSHEET NOTE
[facility]- Outpatient Rehabilitation and Therapy  [address], [place] [address] office: [phone] fax: [phone]     Physical Therapy Initial Evaluation Certification      Dear Obie Smallwood MD,    We had the pleasure of evaluating the following patient for physical therapy services at [facility] Out[facility] Physical Therapy.  A summary of our findings can be found in the initial assessment below.  This includes our plan of care.  If you have any questions or concerns regarding these findings, please do not hesitate to contact me at the office phone number listed above.  Thank you for the referral.     Physician Signature:_______________________________Date:__________________  By signing above (or electronic signature), therapist’s plan is approved by physician       Physical Therapy: TREATMENT/PROGRESS NOTE   Patient: Fabrizio Mims (64 y.o. male)   Examination Date: 2024   :  1960 MRN: [id number]   Visit #: 6   Insurance Allowable Auth Needed   67/33%    BMN []Yes    [x]No    Insurance: Payor: BCBS / Plan: BCBS OUT OF STATE / Product Type: *No Product type* /   Insurance ID: [id number] - ([place] BCBS)  Secondary Insurance (if applicable):    Treatment Diagnosis:   Right ankle pain M25.571   Medical Diagnosis:  Peroneal tendonitis, right [M76.71]   Referring Physician: Obie Smallwood MD  PCP: Nabeel Cooper DO     Plan of care signed (Y/N):     Date of Patient follow up with Physician:      Progress Report/POC: YES, Date Range for this report: 24 to 24  POC update due: (10 visits /OR AUTH LIMITS, whichever is less)  2024                                             Precautions/ Contra-indications:           Latex allergy:  NO  Pacemaker:    NO  Contraindications for Manipulation: None  Date of Surgery:   Other:  HBP,  [place]    Red Flags:  None    C-SSRS Triggered by Intake questionnaire:   Patient answered 'NO' to both behavioral questions on

## 2024-08-06 ENCOUNTER — HOSPITAL ENCOUNTER (OUTPATIENT)
Dept: PHYSICAL THERAPY | Age: 64
Setting detail: THERAPIES SERIES
Discharge: HOME | End: 2024-08-06
Payer: COMMERCIAL

## 2024-08-06 ENCOUNTER — OFFICE VISIT (OUTPATIENT)
Dept: ORTHOPEDIC SURGERY | Age: 64
End: 2024-08-06
Payer: COMMERCIAL

## 2024-08-06 VITALS — HEIGHT: 73 IN | WEIGHT: 226 LBS | BODY MASS INDEX: 29.95 KG/M2

## 2024-08-06 DIAGNOSIS — S82.62XA CLOSED AVULSION FRACTURE OF LATERAL MALLEOLUS OF LEFT FIBULA, INITIAL ENCOUNTER: Primary | ICD-10-CM

## 2024-08-06 PROCEDURE — 97140 MANUAL THERAPY 1/> REGIONS: CPT | Performed by: PHYSICAL THERAPIST

## 2024-08-06 PROCEDURE — 97110 THERAPEUTIC EXERCISES: CPT | Performed by: PHYSICAL THERAPIST

## 2024-08-06 PROCEDURE — 99213 OFFICE O/P EST LOW 20 MIN: CPT | Performed by: ORTHOPAEDIC SURGERY

## 2024-08-06 NOTE — FLOWSHEET NOTE
[facility]- Outpatient [facility] Therapy  [address], [place] [address] office: [phone] fax: [phone]     Physical Therapy Initial Evaluation Certification      Dear Obie Smallwood MD,    We had the pleasure of evaluating the following patient for physical therapy services at [facility] Out[facility] Physical Therapy.  A summary of our findings can be found in the initial assessment below.  This includes our plan of care.  If you have any questions or concerns regarding these findings, please do not hesitate to contact me at the office phone number listed above.  Thank you for the referral.     Physician Signature:_______________________________Date:__________________  By signing above (or electronic signature), therapist’s plan is approved by physician       Physical Therapy: TREATMENT/PROGRESS NOTE   Patient: Fabrizio Mims (64 y.o. male)   Examination Date: 2024   :  1960 MRN: [id number]   Visit #: 7   Insurance Allowable Auth Needed   67/33%    BMN []Yes    [x]No    Insurance: Payor: BCBS / Plan: BCBS OUT OF STATE / Product Type: *No Product type* /   Insurance ID: [id number] - ([place] BC)  Secondary Insurance (if applicable):    Treatment Diagnosis:   Right ankle pain M25.571   Medical Diagnosis:  Peroneal tendonitis, right [M76.71]   Referring Physician: Obie Smallwood MD  PCP: Nabeel Cooper DO     Plan of care signed (Y/N):     Date of Patient follow up with Physician:      Progress Report/POC: NO  POC update due: (10 visits /OR AUTH LIMITS, whichever is less)  2024                                             Precautions/ Contra-indications:           Latex allergy:  NO  Pacemaker:    NO  Contraindications for Manipulation: None  Date of Surgery:   Other:  HBP,  CA    Red Flags:  None    C-SSRS Triggered by Intake questionnaire:   Patient answered 'NO' to both behavioral questions on intake.  No further screening

## 2024-08-12 ENCOUNTER — PATIENT MESSAGE (OUTPATIENT)
Dept: FAMILY MEDICINE CLINIC | Age: 64
End: 2024-08-12

## 2024-08-12 ENCOUNTER — HOSPITAL ENCOUNTER (OUTPATIENT)
Dept: PHYSICAL THERAPY | Age: 64
Setting detail: THERAPIES SERIES
Discharge: HOME | End: 2024-08-12
Payer: COMMERCIAL

## 2024-08-12 DIAGNOSIS — Z12.11 SCREEN FOR COLON CANCER: Primary | ICD-10-CM

## 2024-08-12 PROCEDURE — 97110 THERAPEUTIC EXERCISES: CPT | Performed by: PHYSICAL THERAPIST

## 2024-08-12 PROCEDURE — 97112 NEUROMUSCULAR REEDUCATION: CPT | Performed by: PHYSICAL THERAPIST

## 2024-08-12 NOTE — FLOWSHEET NOTE
Charge Justification:  (06157) THERAPEUTIC EXERCISE - Provided verbal/tactile cueing for activities related to strengthening, flexibility, endurance, ROM performed to prevent loss of range of motion, maintain or improve muscular strength or increase flexibility, following either an injury or surgery.   (62374) HOME EXERCISE PROGRAM - Reviewed/Progressed HEP activities related to strengthening, flexibility, endurance, ROM performed to prevent loss of range of motion, maintain or improve muscular strength or increase flexibility, following either an injury or surgery.  (55540) NEUROMUSCULAR RE-EDUCATION - Therapeutic procedure, 1 or more areas, each 15 minutes; neuromuscular reeducation of movement, balance, coordination, kinesthetic sense, posture, and/or proprioception for sitting and/or standing activities  (79027) MANUAL THERAPY -  Manual therapy techniques, 1 or more regions, each 15 minutes (Mobilization/manipulation, manual lymphatic drainage, manual traction) for the purpose of modulating pain, promoting relaxation,  increasing ROM, reducing/eliminating soft tissue swelling/inflammation/restriction, improving soft tissue extensibility and allowing for proper ROM for normal function with self care, mobility, lifting and ambulation    GOALS     Patient stated goal: painfree walking on uneven ground  [] Progressing: [] Met: [] Not Met: [] Adjusted    Therapist goals for Patient:   Short Term Goals: To be achieved in: 2 weeks  1. Independent in HEP and progression per patient tolerance, in order to prevent re-injury.   [x] Progressing: [] Met: [] Not Met: [] Adjusted  2. Patient will have a decrease in pain to <1/10 to facilitate improvement in movement, function, and ADLs as indicated by Functional Deficits.  [x] Progressing: [] Met: [] Not Met: [] Adjusted    Long Term Goals: To be achieved in: 6 weeks  1. Disability index score of 20% or less for the LEFS to assist with reaching prior level of function

## 2024-08-14 NOTE — TELEPHONE ENCOUNTER
He can consider cologuard since it appears his last colonoscopy showed no evidence of polyps. There is about a 13% false positive rate meaning everything is normal but it still shows a positive screen. If there is a positive screen then will need a follow up colonosocpy. This is repeated every 3 years rather than every 10 for colonoscopy.

## 2024-08-19 ENCOUNTER — HOSPITAL ENCOUNTER (OUTPATIENT)
Dept: PHYSICAL THERAPY | Age: 64
Setting detail: THERAPIES SERIES
Discharge: HOME | End: 2024-08-19
Payer: COMMERCIAL

## 2024-08-19 PROCEDURE — 97110 THERAPEUTIC EXERCISES: CPT | Performed by: PHYSICAL THERAPIST

## 2024-08-19 PROCEDURE — 97140 MANUAL THERAPY 1/> REGIONS: CPT | Performed by: PHYSICAL THERAPIST

## 2024-08-19 NOTE — FLOWSHEET NOTE
[facility]- Outpatient Rehabilitation and Therapy  [address], [place] [address] office: [phone] fax: [phone]     Physical Therapy Initial Evaluation Certification      Dear Obie Smallwood MD,    We had the pleasure of evaluating the following patient for physical therapy services at [facility] Out[facility] Physical Therapy.  A summary of our findings can be found in the initial assessment below.  This includes our plan of care.  If you have any questions or concerns regarding these findings, please do not hesitate to contact me at the office phone number listed above.  Thank you for the referral.     Physician Signature:_______________________________Date:__________________  By signing above (or electronic signature), therapist’s plan is approved by physician       Physical Therapy: TREATMENT/PROGRESS NOTE   Patient: Fabrizio Mims (64 y.o. male)   Examination Date: 2024   :  1960 MRN: [id number]   Visit #: 9   Insurance Allowable Auth Needed   67/33%    BMN []Yes    [x]No    Insurance: Payor: BCBS / Plan: BCBS OUT OF STATE / Product Type: *No Product type* /   Insurance ID: [id number] - ([place] BC)  Secondary Insurance (if applicable):    Treatment Diagnosis:   Right ankle pain M25.571   Medical Diagnosis:  Peroneal tendonitis, right [M76.71]   Referring Physician: Obie Smallwood MD  PCP: Nabeel Cooper DO     Plan of care signed (Y/N):     Date of Patient follow up with Physician:      Progress Report/POC: NO  POC update due: (10 visits /OR AUTH LIMITS, whichever is less)  2024                                             Precautions/ Contra-indications:           Latex allergy:  NO  Pacemaker:    NO  Contraindications for Manipulation: None  Date of Surgery:   Other:  HBP,  CA    Red Flags:  None    C-SSRS Triggered by Intake questionnaire:   Patient answered 'NO' to both behavioral questions on intake.  No further screening

## 2024-08-26 ENCOUNTER — HOSPITAL ENCOUNTER (OUTPATIENT)
Dept: PHYSICAL THERAPY | Age: 64
Setting detail: THERAPIES SERIES
Discharge: HOME | End: 2024-08-26
Payer: COMMERCIAL

## 2024-08-26 PROCEDURE — 97110 THERAPEUTIC EXERCISES: CPT | Performed by: PHYSICAL THERAPIST

## 2024-08-26 PROCEDURE — 97140 MANUAL THERAPY 1/> REGIONS: CPT | Performed by: PHYSICAL THERAPIST

## 2024-08-26 NOTE — FLOWSHEET NOTE
hold  - Tandem Stance with Support  - 2 x daily - 7 x weekly - 1 sets - 2 reps - 60 hold      ASSESSMENT      Today's Assessment: During therapy this date, patient required progression of exercises and program and manual interventions for improving proper muscle recruitment and activation/motor control patterns, modulating pain, and increasing ROM.Patient will continue to benefit from ongoing evaluation and advanced clinical decision from a Physical Therapist to address and improve neuromuscular control, normalization of gait, and balance and proprioception to safely return to PLOF without symptoms or restrictions. Pt had the most relief after hawks  was performed along both the mm belly and tendons of lateral ankle.     Medical Necessity Documentation:  I certify that this patient meets the below criteria necessary for medical necessity for care and/or justification of therapy services:  The patient has a musculoskeletal condition(s) with a corresponding ICD-10 code that is of complexity and severity that require skilled therapeutic intervention. This has a direct and significant impact on the need for therapy and significantly impacts the rate of recovery.     Return to Play: NA    Prognosis for POC: [x] Good [] Fair  [] Poor    Patient requires continued skilled intervention: [x] Yes  [] No      CHARGE CAPTURE     PT CHARGE GRID   CPT Code (TIMED) minutes # CPT Code (UNTIMED) #     Therex (12663)  14 1  EVAL:LOW (09129 - Typically 20 minutes face-to-face)     Neuromusc. Re-ed (09279) 8 1  Re-Eval (17406)     Manual (01509) 14 0  Estim Unattended (54413)     Ther. Act (02934)    [facility]. Traction (38177)     Gait (24019)    Dry Needle 1-2 muscle (25443)     Aquatic Therex (49066)    Dry Needle 3+ muscle (20561)     Iontophoresis (89060)    VASO (91993)     Ultrasound (24138)    Group Therapy (41005)     Estim Attended (79661)    Canalith Repositioning (01895)     Other:    Other:    Total Timed Code Tx Minutes 36  reaching prior level of function with activities such as walking on grass with reciprocal gait.  [] Progressing: [x] Met: [] Not Met: [] Adjusted  2. Patient will demonstrate increased AROM of right ankle DF to 15, PF to 50, Inv 45  without pain to allow for proper joint functioning to enable patient to ascend/ descend stairs with reciprocal gait.   [x] Progressing: [] Met: [] Not Met: [] Adjusted  3. Patient will demonstrate increased Strength of right ankle to at least 5/5 throughout without pain to allow for proper functional mobility to enable patient to return to working full day without pain/ weakeness.   [] Progressing: [] Met: [] Not Met: [] Adjusted  4. Patient will return to walking for exercise without increased symptoms or restriction.   [x] Progressing: [] Met: [] Not Met: [] Adjusted  5. Able to complete yard work without pain or loss of balance  [x] Progressing: [] Met: [] Not Met: [] Adjusted     Overall Progression Towards Functional goals/ Treatment Progress Update:  [x] Patient is progressing as expected towards functional goals listed.    [] Progression is slowed due to complexities/Impairments listed.  [] Progression has been slowed due to co-morbidities.  [] Plan just implemented, too soon (<30days) to assess goals progression   [] Goals require adjustment due to lack of progress  [] Patient is not progressing as expected and requires additional follow up with physician  [] Other:     TREATMENT PLAN     Frequency/Duration: 1-2x/week for 6 weeks for the following treatment interventions:    Interventions:  Therapeutic Exercise (55812) including: strength training, ROM, and functional mobility  Neuromuscular Re-education (72979) activation and proprioception, including postural re-education.    Manual Therapy (11425) as indicated to include: Passive Range of Motion, Gr I-IV mobilizations, and Soft Tissue Mobilization  Modalities as needed that may include: Vasoneumatic Compression  Patient

## 2024-08-28 DIAGNOSIS — I10 PRIMARY HYPERTENSION: ICD-10-CM

## 2024-08-29 NOTE — TELEPHONE ENCOUNTER
Refill Request     CONFIRM preferred pharmacy with the patient.    If Mail Order Rx - Pend for 90 day refill.      Last Seen: Last Seen Department: 11/22/2023  Last Seen by PCP: 11/22/2023    Last Written: 10/17/2023 135 tab 3 refills     If no future appointment scheduled:  Review the last OV with PCP and review information for follow-up visit,  Route STAFF MESSAGE with patient name to the  Pool for scheduling with the following information:            -  Timing of next visit           -  Visit type ie Physical, OV, etc           -  Diagnoses/Reason ie. COPD, HTN - Do not use MEDICATION, Follow-up or CHECK UP - Give reason for visit      Next Appointment:   Future Appointments   Date Time Provider Department Center   9/3/2024  7:40 AM Elvira Garner, PT [facility]   9/17/2024  8:15 AM Obie Smallwood MD AND [facility]   11/25/2024  8:00 AM Nabeel Cooper, DO [facility] DEP       Message sent to  to schedule appt with patient?  NO      Requested Prescriptions     Pending Prescriptions Disp Refills    doxazosin (CARDURA) 4 MG tablet [Pharmacy Med Name: Doxazosin Mesylate 4 MG Oral Tablet] 135 tablet 3     Sig: TAKE 1 TABLET BY MOUTH IN THE  MORNING AND ONE-HALF TABLET BY  MOUTH IN THE EVENING FOR  HYPERTENSION AND PROSTATE

## 2024-08-30 RX ORDER — DOXAZOSIN 4 MG/1
TABLET ORAL
Qty: 135 TABLET | Refills: 3 | Status: SHIPPED | OUTPATIENT
Start: 2024-08-30

## 2024-09-03 ENCOUNTER — HOSPITAL ENCOUNTER (OUTPATIENT)
Dept: PHYSICAL THERAPY | Age: 64
Setting detail: THERAPIES SERIES
Discharge: HOME | End: 2024-09-03
Payer: COMMERCIAL

## 2024-09-03 PROCEDURE — 97140 MANUAL THERAPY 1/> REGIONS: CPT | Performed by: PHYSICAL THERAPIST

## 2024-09-03 PROCEDURE — 97110 THERAPEUTIC EXERCISES: CPT | Performed by: PHYSICAL THERAPIST

## 2024-09-03 NOTE — PLAN OF CARE
[facility]- Outpatient Rehabilitation and Therapy  [address], [place] [address] office: [phone] fax: [phone]     Physical Therapy Initial Evaluation Certification  Physical Therapy Re-Certification Plan of Care    Dear Obie Smallwood MD  ,    We had the pleasure of treating the following patient for physical therapy services at [facility] Outpatient Physical Therapy. A summary of our findings can be found in the updated assessment below.  This includes our plan of care.  If you have any questions or concerns regarding these findings, please do not hesitate to contact me at the office phone number checked above.  Thank you for the referral.     Physician Signature:________________________________Date:__________________  By signing above (or electronic signature), therapist's plan is approved by physician      Functional Outcome: LEFS 63 = 21%  Fabrizio Mims 1960 continues to present with functional deficits in strength symmetry  limiting ability with walking on uneven ground and heavy home activity .  During therapy this date, patient required progression of exercises and program and manual interventions for exercise progression, allowing for proper ROM, static and dynamic balance, and kinesthetic sense and proprioception. Patient will continue to benefit from ongoing evaluation and advanced clinical decision from a Physical Therapist to improve muscle strength, normalization of gait, and high level IADLs to safely return to OF without symptoms or restrictions.    Overall Response to Treatment:  Patient is responding well to treatment and improvement is noted with regards to goals    Total Visits: 11     Recommendation:    [x] Continue PT 1-2x / wk for 4 weeks.   [] Hold PT, pending MD visit   [] Discharge to [facility]. Follow up with PT or MD PRN.      Dear Obie Smallwood MD,    We had the pleasure of evaluating the following patient for physical therapy services at

## 2024-09-06 LAB — NONINV COLON CA DNA+OCC BLD SCRN STL QL: POSITIVE

## 2024-09-17 ENCOUNTER — HOSPITAL ENCOUNTER (OUTPATIENT)
Dept: PHYSICAL THERAPY | Age: 64
Setting detail: THERAPIES SERIES
Discharge: HOME | End: 2024-09-17
Payer: COMMERCIAL

## 2024-09-17 ENCOUNTER — OFFICE VISIT (OUTPATIENT)
Dept: ORTHOPEDIC SURGERY | Age: 64
End: 2024-09-17
Payer: COMMERCIAL

## 2024-09-17 VITALS — BODY MASS INDEX: 29.95 KG/M2 | HEIGHT: 73 IN | WEIGHT: 226 LBS

## 2024-09-17 DIAGNOSIS — S82.62XA CLOSED AVULSION FRACTURE OF LATERAL MALLEOLUS OF LEFT FIBULA, INITIAL ENCOUNTER: Primary | ICD-10-CM

## 2024-09-17 DIAGNOSIS — M76.71 PERONEAL TENDONITIS, RIGHT: ICD-10-CM

## 2024-09-17 PROCEDURE — 97140 MANUAL THERAPY 1/> REGIONS: CPT | Performed by: PHYSICAL THERAPIST

## 2024-09-17 PROCEDURE — 99213 OFFICE O/P EST LOW 20 MIN: CPT | Performed by: ORTHOPAEDIC SURGERY

## 2024-09-17 PROCEDURE — 97110 THERAPEUTIC EXERCISES: CPT | Performed by: PHYSICAL THERAPIST

## 2024-09-30 ENCOUNTER — HOSPITAL ENCOUNTER (OUTPATIENT)
Dept: PHYSICAL THERAPY | Age: 64
Setting detail: THERAPIES SERIES
Discharge: HOME | End: 2024-09-30
Payer: COMMERCIAL

## 2024-09-30 PROCEDURE — 97112 NEUROMUSCULAR REEDUCATION: CPT | Performed by: PHYSICAL THERAPIST

## 2024-09-30 PROCEDURE — 97140 MANUAL THERAPY 1/> REGIONS: CPT | Performed by: PHYSICAL THERAPIST

## 2024-09-30 NOTE — FLOWSHEET NOTE
Reviewed/Progressed HEP activities related to strengthening, flexibility, endurance, ROM performed to prevent loss of range of motion, maintain or improve muscular strength or increase flexibility, following either an injury or surgery.  (00803) NEUROMUSCULAR RE-EDUCATION - Therapeutic procedure, 1 or more areas, each 15 minutes; neuromuscular reeducation of movement, balance, coordination, kinesthetic sense, posture, and/or proprioception for sitting and/or standing activities  (51780) MANUAL THERAPY -  Manual therapy techniques, 1 or more regions, each 15 minutes (Mobilization/manipulation, manual lymphatic drainage, manual traction) for the purpose of modulating pain, promoting relaxation,  increasing ROM, reducing/eliminating soft tissue swelling/inflammation/restriction, improving soft tissue extensibility and allowing for proper ROM for normal function with self care, mobility, lifting and ambulation    GOALS     Patient stated goal: painfree walking on uneven ground  [x] Progressing: [] Met: [] Not Met: [] Adjusted    Therapist goals for Patient:   Short Term Goals: To be achieved in: 2 weeks  1. Independent in HEP and progression per patient tolerance, in order to prevent re-injury.   [x] Progressing: [] Met: [] Not Met: [] Adjusted  2. Patient will have a decrease in pain to <1/10 to facilitate improvement in movement, function, and ADLs as indicated by Functional Deficits.  [x] Progressing: [] Met: [] Not Met: [] Adjusted    Long Term Goals: To be achieved in: 6 weeks  1. Disability index score of 20% or less for the LEFS to assist with reaching prior level of function with activities such as walking on grass with reciprocal gait.  [] Progressing: [x] Met: [] Not Met: [] Adjusted  2. Patient will demonstrate increased AROM of right ankle DF to 15, PF to 50, Inv 45  without pain to allow for proper joint functioning to enable patient to ascend/ descend stairs with reciprocal gait.   [x] Progressing: [] Met:

## 2024-10-25 DIAGNOSIS — G47.00 INSOMNIA, UNSPECIFIED TYPE: ICD-10-CM

## 2024-10-25 DIAGNOSIS — G25.81 RLS (RESTLESS LEGS SYNDROME): ICD-10-CM

## 2024-10-25 NOTE — TELEPHONE ENCOUNTER
Refill Request - Controlled Substance    CONFIRM preferred pharmacy with the patient.    If Mail Order Rx - Pend for 90 day refill.        Last Seen Department: 11/22/2023  Last Seen by PCP: 11/22/2023    Last Written: gabapentin 6/3/24 45 with 1 refill     Pamelor 10/25/23 270 with 1 refill   Last UDS: none     Med Agreement Signed On: 4/27/18     If no future appointment scheduled:  Review the last OV with PCP and review information for follow-up visit,  Route STAFF MESSAGE with patient name to the  Pool for scheduling with the following information:            -  Timing of next visit           -  Visit type ie Physical, OV, etc           -  Diagnoses/Reason ie. COPD, HTN - Do not use MEDICATION, Follow-up or CHECK UP - Give reason for visit        Next Appointment:   Future Appointments   Date Time Provider Department Center   11/25/2024  8:00 AM Nabeel Cooper DO [facility] DEP       Message sent to  to schedule appt with patient?  NO      Requested Prescriptions     Pending Prescriptions Disp Refills    nortriptyline (PAMELOR) 25 MG capsule [Pharmacy Med Name: NORTRIPTYLINE  25MG  CAP] 270 capsule 3     Sig: TAKE 1 TO 3 CAPSULES BY MOUTH 1  OR 2 HOURS BEFORE BEDTIME FOR  SLEEP    gabapentin (NEURONTIN) 600 MG tablet [Pharmacy Med Name: Gabapentin 600 MG Oral Tablet] 45 tablet 3     Sig: TAKE ONE-HALF TABLET BY MOUTH AT BEDTIME

## 2024-10-26 RX ORDER — NORTRIPTYLINE HYDROCHLORIDE 25 MG/1
CAPSULE ORAL
Qty: 270 CAPSULE | Refills: 3 | Status: SHIPPED | OUTPATIENT
Start: 2024-10-26

## 2024-10-26 RX ORDER — GABAPENTIN 600 MG/1
TABLET ORAL
Qty: 45 TABLET | Refills: 3 | Status: SHIPPED | OUTPATIENT
Start: 2024-10-26 | End: 2025-10-26

## 2024-11-22 ENCOUNTER — HOSPITAL ENCOUNTER (OUTPATIENT)
Dept: PHYSICAL THERAPY | Age: 64
Setting detail: THERAPIES SERIES
Discharge: HOME | End: 2024-11-22
Payer: COMMERCIAL

## 2024-11-22 PROCEDURE — 97110 THERAPEUTIC EXERCISES: CPT | Performed by: PHYSICAL THERAPIST

## 2024-11-22 PROCEDURE — 97140 MANUAL THERAPY 1/> REGIONS: CPT | Performed by: PHYSICAL THERAPIST

## 2024-11-22 NOTE — FLOWSHEET NOTE
[facility]- Outpatient Rehabilitation and Therapy  [address], [place] [address] office: [phone] fax: [phone]     Physical Therapy Initial Evaluation Certification      Dear Obie Smallwood MD,    We had the pleasure of evaluating the following patient for physical therapy services at [facility] Out[facility] Physical Therapy.  A summary of our findings can be found in the initial assessment below.  This includes our plan of care.  If you have any questions or concerns regarding these findings, please do not hesitate to contact me at the office phone number listed above.  Thank you for the referral.     Physician Signature:_______________________________Date:__________________  By signing above (or electronic signature), therapist’s plan is approved by physician       Physical Therapy: TREATMENT/PROGRESS NOTE   Patient: Fabrizio Mims (64 y.o. male)   Examination Date: 2024   :  1960 MRN: [id number]   Visit #: 14   Insurance Allowable Auth Needed   67/33%    BMN []Yes    [x]No    Insurance: Payor: BCBS / Plan: BCBS OUT OF STATE / Product Type: *No Product type* /   Insurance ID: [id number] - ([place] BCBS)  Secondary Insurance (if applicable):    Treatment Diagnosis:   Right ankle pain M25.571   Medical Diagnosis:  Peroneal tendonitis, right [M76.71]   Referring Physician: Obie Smallwood MD  PCP: Nabeel Cooper DO     Plan of care signed (Y/N):     Date of Patient follow up with Physician:      Progress Report/POC: NO  POC update due: (10 visits /OR AUTH LIMITS, whichever is less)  2024                                             Precautions/ Contra-indications:           Latex allergy:  NO  Pacemaker:    NO  Contraindications for Manipulation: None  Date of Surgery:   Other:  HBP,  [place]    Red Flags:  None    C-SSRS Triggered by Intake questionnaire:   Patient answered 'NO' to both behavioral questions on intake.  No further screening

## 2024-11-24 ASSESSMENT — PATIENT HEALTH QUESTIONNAIRE - PHQ9
SUM OF ALL RESPONSES TO PHQ QUESTIONS 1-9: 0
SUM OF ALL RESPONSES TO PHQ9 QUESTIONS 1 & 2: 0
1. LITTLE INTEREST OR PLEASURE IN DOING THINGS: NOT AT ALL
SUM OF ALL RESPONSES TO PHQ9 QUESTIONS 1 & 2: 0
SUM OF ALL RESPONSES TO PHQ QUESTIONS 1-9: 0
1. LITTLE INTEREST OR PLEASURE IN DOING THINGS: NOT AT ALL
2. FEELING DOWN, DEPRESSED OR HOPELESS: NOT AT ALL
SUM OF ALL RESPONSES TO PHQ QUESTIONS 1-9: 0
SUM OF ALL RESPONSES TO PHQ QUESTIONS 1-9: 0
2. FEELING DOWN, DEPRESSED OR HOPELESS: NOT AT ALL

## 2024-11-25 ENCOUNTER — OFFICE VISIT (OUTPATIENT)
Dept: FAMILY MEDICINE CLINIC | Age: 64
End: 2024-11-25

## 2024-11-25 VITALS
HEART RATE: 81 BPM | SYSTOLIC BLOOD PRESSURE: 130 MMHG | DIASTOLIC BLOOD PRESSURE: 70 MMHG | BODY MASS INDEX: 30.38 KG/M2 | WEIGHT: 229.2 LBS | HEIGHT: 73 IN | OXYGEN SATURATION: 97 %

## 2024-11-25 DIAGNOSIS — Z85.828 HISTORY OF SKIN CANCER: ICD-10-CM

## 2024-11-25 DIAGNOSIS — N40.1 BENIGN PROSTATIC HYPERPLASIA WITH URINARY FREQUENCY: ICD-10-CM

## 2024-11-25 DIAGNOSIS — R35.0 BENIGN PROSTATIC HYPERPLASIA WITH URINARY FREQUENCY: ICD-10-CM

## 2024-11-25 DIAGNOSIS — Z12.5 ENCOUNTER FOR SCREENING FOR MALIGNANT NEOPLASM OF PROSTATE: ICD-10-CM

## 2024-11-25 DIAGNOSIS — G25.81 RLS (RESTLESS LEGS SYNDROME): ICD-10-CM

## 2024-11-25 DIAGNOSIS — Z00.00 ENCOUNTER FOR ANNUAL PHYSICAL EXAM: Primary | ICD-10-CM

## 2024-11-25 DIAGNOSIS — I10 PRIMARY HYPERTENSION: ICD-10-CM

## 2024-11-25 DIAGNOSIS — G47.33 OBSTRUCTIVE SLEEP APNEA SYNDROME: ICD-10-CM

## 2024-11-25 LAB
ALBUMIN SERPL-MCNC: 4.5 G/DL (ref 3.4–5)
ALBUMIN/GLOB SERPL: 2 {RATIO} (ref 1.1–2.2)
ALP SERPL-CCNC: 86 U/L (ref 40–129)
ALT SERPL-CCNC: 27 U/L (ref 10–40)
ANION GAP SERPL CALCULATED.3IONS-SCNC: 10 MMOL/L (ref 3–16)
AST SERPL-CCNC: 33 U/L (ref 15–37)
BILIRUB SERPL-MCNC: 0.4 MG/DL (ref 0–1)
BUN SERPL-MCNC: 20 MG/DL (ref 7–20)
CALCIUM SERPL-MCNC: 9.4 MG/DL (ref 8.3–10.6)
CHLORIDE SERPL-SCNC: 106 MMOL/L (ref 99–110)
CHOLEST SERPL-MCNC: 170 MG/DL (ref 0–199)
CO2 SERPL-SCNC: 27 MMOL/L (ref 21–32)
CREAT SERPL-MCNC: 1 MG/DL (ref 0.8–1.3)
GFR SERPLBLD CREATININE-BSD FMLA CKD-EPI: 84 ML/MIN/{1.73_M2}
GLUCOSE SERPL-MCNC: 98 MG/DL (ref 70–99)
HDLC SERPL-MCNC: 52 MG/DL (ref 40–60)
LDL CHOLESTEROL: 100 MG/DL
POTASSIUM SERPL-SCNC: 4.3 MMOL/L (ref 3.5–5.1)
PROT SERPL-MCNC: 6.7 G/DL (ref 6.4–8.2)
PSA SERPL DL<=0.01 NG/ML-MCNC: 0.9 NG/ML (ref 0–4)
SODIUM SERPL-SCNC: 143 MMOL/L (ref 136–145)
TRIGL SERPL-MCNC: 89 MG/DL (ref 0–150)
VLDLC SERPL CALC-MCNC: 18 MG/DL

## 2024-11-25 SDOH — ECONOMIC STABILITY: FOOD INSECURITY: WITHIN THE PAST 12 MONTHS, YOU WORRIED THAT YOUR FOOD WOULD RUN OUT BEFORE YOU GOT MONEY TO BUY MORE.: NEVER TRUE

## 2024-11-25 SDOH — ECONOMIC STABILITY: FOOD INSECURITY: WITHIN THE PAST 12 MONTHS, THE FOOD YOU BOUGHT JUST DIDN'T LAST AND YOU DIDN'T HAVE MONEY TO GET MORE.: NEVER TRUE

## 2024-11-25 SDOH — ECONOMIC STABILITY: INCOME INSECURITY: HOW HARD IS IT FOR YOU TO PAY FOR THE VERY BASICS LIKE FOOD, HOUSING, MEDICAL CARE, AND HEATING?: NOT HARD AT ALL

## 2024-11-25 NOTE — PROGRESS NOTES
Assessment:  Encounter Diagnoses   Name Primary?    Encounter for annual physical exam Yes    Benign prostatic hyperplasia with urinary frequency     Primary hypertension     Obstructive sleep apnea syndrome     RLS (restless legs syndrome)     Encounter for screening for malignant neoplasm of prostate     History of skin cancer        Plan:  1. Encounter for annual physical exam  2. Benign prostatic hyperplasia with urinary frequency  3. Primary hypertension  -     Lipid, Fasting; Future  -     Comprehensive Metabolic Panel; Future  4. Obstructive sleep apnea syndrome  5. RLS (restless legs syndrome)  6. Encounter for screening for malignant neoplasm of prostate  -     PSA Screening; Future  7. History of skin cancer  -     Non BS - External Referral To Dermatology  Reviewed routine health recommendations.  Discussed improving diet and continuing exercise to help with weight loss.  Patient also planning to retire in January which will help decrease overall stress levels.  If continuing to have difficulty losing weight then he reports that he is interested in possibly starting lowly direct for GLP-1.  Recheck lab work as above for chronic conditions.  Doing well on current medications and no reported adverse side effects.      No follow-ups on file.      Patient: Fabrizio Mims is a 64 y.o. male who presents today with the following Chief Complaint(s):  Chief Complaint   Patient presents with    Annual Exam         HPI    Hypertension  Amlodipine 10 mg  Doxazosin 4 mg in a.m. and 2 mg in the evening    RLS  Controlled on gabapentin 300 mg nightly    JUSTICE  Utilizing mouthguard    Insomnia  Nortriptyline 25 to 75 mg nightly    BPH  Discussed lifestyle modifications last year  Reports previously Flomax increased urinary difficulty  Taking doxazosin 4 mg in a.m. and 2 mg in the evening  Nocturia x1     Positive cologuard  Planning colonoscopy next year    Planning to retire in January    Diet  Exercise  Current

## 2024-12-03 ENCOUNTER — PATIENT MESSAGE (OUTPATIENT)
Dept: FAMILY MEDICINE CLINIC | Age: 64
End: 2024-12-03

## 2024-12-03 NOTE — TELEPHONE ENCOUNTER
Called and spoke with patient.  Patient stated he may have missed a dose Tuesday last week but typically does not miss a dose.  Patient denies chest pain, HA, SOB, dizziness.  Patient is unsure how long ago batteries were changed, advised patient to change the batteries.  Scheduled BP nurse visit tomorrow, advised to bring home monitor with him.  Patient stated understanding.

## 2024-12-04 ENCOUNTER — TELEPHONE (OUTPATIENT)
Dept: FAMILY MEDICINE CLINIC | Age: 64
End: 2024-12-04

## 2024-12-04 ENCOUNTER — NURSE ONLY (OUTPATIENT)
Dept: FAMILY MEDICINE CLINIC | Age: 64
End: 2024-12-04

## 2024-12-04 VITALS — DIASTOLIC BLOOD PRESSURE: 93 MMHG | SYSTOLIC BLOOD PRESSURE: 146 MMHG

## 2024-12-04 NOTE — TELEPHONE ENCOUNTER
Onur Cooper, patient was in on lab schedule today for a BP check. Stated his readings have been up and down on his wrist cuff he has. We got the following readings below:    Our cuff= 132/72 R arm Large cuff  His wrist cuff= 146/93    **Didn't have him sit for 10 minutes for a repeat BP due to he has an appt at 11:00 in [facility] to get to.     His BP this morning at 7:00am before medication was 156/108 with his cuff. At 9:00am after medication was 126/81.    He has some readings from the past few mornings in the 140s over 90s, but those were before medication.

## 2024-12-04 NOTE — TELEPHONE ENCOUNTER
Please let patient know that due to discrepancy between values, I would recommend that he obtain an arm BP cuff for monitoring and stop using wrist cuff

## 2025-01-14 ENCOUNTER — PATIENT MESSAGE (OUTPATIENT)
Dept: FAMILY MEDICINE CLINIC | Age: 65
End: 2025-01-14

## 2025-01-14 DIAGNOSIS — I10 PRIMARY HYPERTENSION: Primary | ICD-10-CM

## 2025-01-15 RX ORDER — LISINOPRIL 10 MG/1
10 TABLET ORAL DAILY
Qty: 90 TABLET | Refills: 0 | Status: SHIPPED | OUTPATIENT
Start: 2025-01-15

## 2025-04-01 ENCOUNTER — OFFICE VISIT (OUTPATIENT)
Dept: ORTHOPEDIC SURGERY | Age: 65
End: 2025-04-01
Payer: COMMERCIAL

## 2025-04-01 VITALS — BODY MASS INDEX: 30.35 KG/M2 | HEIGHT: 73 IN | WEIGHT: 229 LBS

## 2025-04-01 DIAGNOSIS — M25.852 LEFT HIP IMPINGEMENT SYNDROME: ICD-10-CM

## 2025-04-01 DIAGNOSIS — M16.12 PRIMARY OSTEOARTHRITIS OF LEFT HIP: ICD-10-CM

## 2025-04-01 DIAGNOSIS — M54.16 LUMBAR RADICULOPATHY: ICD-10-CM

## 2025-04-01 DIAGNOSIS — R52 PAIN: Primary | ICD-10-CM

## 2025-04-01 PROCEDURE — 99213 OFFICE O/P EST LOW 20 MIN: CPT | Performed by: ORTHOPAEDIC SURGERY

## 2025-04-01 NOTE — PROGRESS NOTES
Dr Kyree Dasilva      Date /Time 4/1/2025       3:44 PM EST  Name Fabrizio Mims             1960   Location  [facility]  MRN [id number]                Chief Complaint   Patient presents with    Follow-up     Ck Left Hip ([place] Cortisone 04/07/2022)         History of Present Illness    Fabrizio Mims is a 64 y.o. male who presents with  left hip pain.        .  Athletic/exercise activity: no sports.  Injury Mechanism:  none.  Worker's Comp. & legal issues:   none.  Previous Treatments: Ice, Heat and NSAIDs    Patient presents the office today for a follow-up visit.  Patient being treated for moderate left hip osteoarthritis with lumbar radiculopathy.  Patient did receive intra-articular cortisone injection left hip on April 7, 2022.  Today his pain is more in his buttocks.  He does not have much groin pain.  He has been going to stretch lab.  No other direct injury or trauma.    Previous history: Patient presents the office today for a follow-up visit.  Patient here with a chief complaint of left hip pain.  Patient did have an intra-articular injection on 4/7/2022.  The injection did work well.  His groin and lateral hip pain did improve.  He also saw Dr. Morataya.  Patient does have a spondylolisthesis which is responsible for his foot drop.  Dr. Garcia did recommend physical therapy which has helped not only his hip but his lumbar spine.  They also talked about lumbar fusion at some point in the future.  Today his symptoms have started to return but are concentrated lateral.  He has no groin pain.  He has had no new injury or trauma    Previous history: Patient is here for follow-up visit.  He is here for MRI results left hip.  He has also had an MRI of his lumbar spine.  We believe that his last visit that his foot drop was caused by his lumbar spine.  He was supposed to follow-up with Dr. Morataya.  He continues to complain of pain over the groin.    Previous history: Patient presents the office

## 2025-04-06 DIAGNOSIS — I10 PRIMARY HYPERTENSION: ICD-10-CM

## 2025-04-07 NOTE — TELEPHONE ENCOUNTER
.Refill Request     CONFIRM preferred pharmacy with the patient.    If Mail Order Rx - Pend for 90 day refill.      Last Seen: Last Seen Department: 11/25/2024  Last Seen by PCP: 11/25/2024    Last Written: 1-15-25 90 with 0     If no future appointment scheduled:  Review the last OV with PCP and review information for follow-up visit,  Route STAFF MESSAGE with patient name to the  Pool for scheduling with the following information:            -  Timing of next visit           -  Visit type ie Physical, OV, etc           -  Diagnoses/Reason ie. COPD, HTN - Do not use MEDICATION, Follow-up or CHECK UP - Give reason for visit      Next Appointment:   Future Appointments   Date Time Provider Department Center   11/26/2025  8:00 AM Nabeel Cooper, DO [facility] DEP       Message sent to  to schedule appt with patient?  NO      Requested Prescriptions     Pending Prescriptions Disp Refills    lisinopril (PRINIVIL;ZESTRIL) 10 MG tablet [Pharmacy Med Name: LISINOPRIL 10 MG TABLET] 90 tablet 0     Sig: TAKE 1 TABLET BY MOUTH DAILY

## 2025-04-08 RX ORDER — LISINOPRIL 10 MG/1
10 TABLET ORAL DAILY
Qty: 90 TABLET | Refills: 1 | Status: SHIPPED | OUTPATIENT
Start: 2025-04-08

## 2025-05-02 DIAGNOSIS — E78.5 ELEVATED LIPIDS: ICD-10-CM

## 2025-05-02 DIAGNOSIS — I10 PRIMARY HYPERTENSION: ICD-10-CM

## 2025-05-03 NOTE — TELEPHONE ENCOUNTER
Refill Request     CONFIRM preferred pharmacy with the patient.    If Mail Order Rx - Pend for 90 day refill.      Last Seen: Last Seen Department: 11/25/2024  Last Seen by PCP: 11/25/2024    Last Written: 6/3/2024 Niacin  8/30/2024 Doxazosin  6/3/2024 Amlodipine    If no future appointment scheduled:  Review the last OV with PCP and review information for follow-up visit,  Route STAFF MESSAGE with patient name to the  Pool for scheduling with the following information:            -  Timing of next visit           -  Visit type ie Physical, OV, etc           -  Diagnoses/Reason ie. COPD, HTN - Do not use MEDICATION, Follow-up or CHECK UP - Give reason for visit      Next Appointment:   Future Appointments   Date Time Provider Department Center   11/26/2025  8:00 AM Nabeel Cooper DO [facility] DEP       Message sent to  to schedule appt with patient?  NO      Requested Prescriptions     Pending Prescriptions Disp Refills    niacin (NIASPAN) 500 MG extended release tablet [Pharmacy Med Name: NIACIN  500MG  TAB  EXTENDED RELEASE] 90 tablet 3     Sig: TAKE 1 TABLET BY MOUTH EVERY  NIGHT    doxazosin (CARDURA) 4 MG tablet [Pharmacy Med Name: Doxazosin Mesylate 4 MG Oral Tablet] 135 tablet 3     Sig: TAKE 1 TABLET BY MOUTH IN THE  MORNING AND ONE-HALF TABLET BY  MOUTH IN THE EVENING FOR  HYPERTENSION AND PROSTATE    amLODIPine (NORVASC) 10 MG tablet [Pharmacy Med Name: amLODIPine Besylate 10 MG Oral Tablet] 90 tablet 3     Sig: TAKE 1 TABLET BY MOUTH DAILY

## 2025-05-05 RX ORDER — DOXAZOSIN 4 MG/1
TABLET ORAL
Qty: 135 TABLET | Refills: 3 | Status: SHIPPED | OUTPATIENT
Start: 2025-05-05

## 2025-05-05 RX ORDER — AMLODIPINE BESYLATE 10 MG/1
10 TABLET ORAL DAILY
Qty: 90 TABLET | Refills: 3 | Status: SHIPPED | OUTPATIENT
Start: 2025-05-05

## 2025-05-05 RX ORDER — NIACIN 500 MG/1
500 TABLET, EXTENDED RELEASE ORAL NIGHTLY
Qty: 90 TABLET | Refills: 3 | Status: SHIPPED | OUTPATIENT
Start: 2025-05-05

## 2025-06-30 ENCOUNTER — PATIENT MESSAGE (OUTPATIENT)
Dept: FAMILY MEDICINE CLINIC | Age: 65
End: 2025-06-30

## 2025-06-30 DIAGNOSIS — R93.429 ABNORMAL FINDING ON DIAGNOSTIC IMAGING OF KIDNEY: Primary | ICD-10-CM

## 2025-06-30 DIAGNOSIS — I10 PRIMARY HYPERTENSION: ICD-10-CM

## 2025-07-03 ENCOUNTER — HOSPITAL ENCOUNTER (OUTPATIENT)
Dept: ULTRASOUND IMAGING | Age: 65
Discharge: HOME | End: 2025-07-03
Payer: COMMERCIAL

## 2025-07-03 DIAGNOSIS — I10 PRIMARY HYPERTENSION: ICD-10-CM

## 2025-07-03 DIAGNOSIS — R93.429 ABNORMAL FINDING ON DIAGNOSTIC IMAGING OF KIDNEY: ICD-10-CM

## 2025-07-03 LAB
ALBUMIN SERPL-MCNC: 4.3 G/DL (ref 3.4–5)
ALBUMIN/GLOB SERPL: 2.2 {RATIO} (ref 1.1–2.2)
ALP SERPL-CCNC: 68 U/L (ref 40–129)
ALT SERPL-CCNC: 25 U/L (ref 10–40)
ANION GAP SERPL CALCULATED.3IONS-SCNC: 10 MMOL/L (ref 3–16)
AST SERPL-CCNC: 21 U/L (ref 15–37)
BASOPHILS # BLD: 0 K/UL (ref 0–0.2)
BASOPHILS NFR BLD: 0.7 %
BILIRUB SERPL-MCNC: 0.4 MG/DL (ref 0–1)
BUN SERPL-MCNC: 20 MG/DL (ref 7–20)
CALCIUM SERPL-MCNC: 9.1 MG/DL (ref 8.3–10.6)
CHLORIDE SERPL-SCNC: 105 MMOL/L (ref 99–110)
CHOLEST SERPL-MCNC: 168 MG/DL (ref 0–199)
CO2 SERPL-SCNC: 26 MMOL/L (ref 21–32)
CREAT SERPL-MCNC: 1.1 MG/DL (ref 0.8–1.3)
DEPRECATED RDW RBC AUTO: 13.8 % (ref 12.4–15.4)
EOSINOPHIL # BLD: 0.2 K/UL (ref 0–0.6)
EOSINOPHIL NFR BLD: 2.3 %
GFR SERPLBLD CREATININE-BSD FMLA CKD-EPI: 75 ML/MIN/{1.73_M2}
GLUCOSE SERPL-MCNC: 93 MG/DL (ref 70–99)
HCT VFR BLD AUTO: 38.8 % (ref 40.5–52.5)
HDLC SERPL-MCNC: 51 MG/DL (ref 40–60)
HGB BLD-MCNC: 13.4 G/DL (ref 13.5–17.5)
LDLC SERPL CALC-MCNC: 86 MG/DL
LYMPHOCYTES # BLD: 1.8 K/UL (ref 1–5.1)
LYMPHOCYTES NFR BLD: 26.6 %
MCH RBC QN AUTO: 30.3 PG (ref 26–34)
MCHC RBC AUTO-ENTMCNC: 34.5 G/DL (ref 31–36)
MCV RBC AUTO: 87.7 FL (ref 80–100)
MONOCYTES # BLD: 0.7 K/UL (ref 0–1.3)
MONOCYTES NFR BLD: 10 %
NEUTROPHILS # BLD: 4.1 K/UL (ref 1.7–7.7)
NEUTROPHILS NFR BLD: 60.4 %
PLATELET # BLD AUTO: 234 K/UL (ref 135–450)
PMV BLD AUTO: 7.9 FL (ref 5–10.5)
POTASSIUM SERPL-SCNC: 4.5 MMOL/L (ref 3.5–5.1)
PROT SERPL-MCNC: 6.3 G/DL (ref 6.4–8.2)
RBC # BLD AUTO: 4.43 M/UL (ref 4.2–5.9)
SODIUM SERPL-SCNC: 141 MMOL/L (ref 136–145)
TRIGL SERPL-MCNC: 155 MG/DL (ref 0–150)
VLDLC SERPL CALC-MCNC: 31 MG/DL
WBC # BLD AUTO: 6.8 K/UL (ref 4–11)

## 2025-07-03 PROCEDURE — 76770 US EXAM ABDO BACK WALL COMP: CPT

## 2025-07-07 ENCOUNTER — RESULTS FOLLOW-UP (OUTPATIENT)
Dept: FAMILY MEDICINE CLINIC | Age: 65
End: 2025-07-07

## 2025-08-25 ENCOUNTER — PATIENT MESSAGE (OUTPATIENT)
Dept: FAMILY MEDICINE CLINIC | Age: 65
End: 2025-08-25

## 2025-08-28 ENCOUNTER — OFFICE VISIT (OUTPATIENT)
Dept: FAMILY MEDICINE CLINIC | Age: 65
End: 2025-08-28
Payer: COMMERCIAL

## 2025-08-28 VITALS
WEIGHT: 208 LBS | HEIGHT: 73 IN | BODY MASS INDEX: 27.57 KG/M2 | OXYGEN SATURATION: 100 % | DIASTOLIC BLOOD PRESSURE: 70 MMHG | HEART RATE: 78 BPM | SYSTOLIC BLOOD PRESSURE: 120 MMHG

## 2025-08-28 DIAGNOSIS — I10 PRIMARY HYPERTENSION: ICD-10-CM

## 2025-08-28 DIAGNOSIS — G25.81 RLS (RESTLESS LEGS SYNDROME): Primary | ICD-10-CM

## 2025-08-28 PROCEDURE — 99214 OFFICE O/P EST MOD 30 MIN: CPT | Performed by: STUDENT IN AN ORGANIZED HEALTH CARE EDUCATION/TRAINING PROGRAM

## 2025-08-28 PROCEDURE — 3074F SYST BP LT 130 MM HG: CPT | Performed by: STUDENT IN AN ORGANIZED HEALTH CARE EDUCATION/TRAINING PROGRAM

## 2025-08-28 PROCEDURE — 3078F DIAST BP <80 MM HG: CPT | Performed by: STUDENT IN AN ORGANIZED HEALTH CARE EDUCATION/TRAINING PROGRAM

## 2025-08-28 RX ORDER — LISINOPRIL 5 MG/1
5 TABLET ORAL DAILY
Qty: 30 TABLET | Refills: 0 | Status: SHIPPED | OUTPATIENT
Start: 2025-08-28

## 2025-08-28 RX ORDER — ROPINIROLE 0.25 MG/1
0.25 TABLET, FILM COATED ORAL NIGHTLY
Qty: 30 TABLET | Refills: 0 | Status: SHIPPED | OUTPATIENT
Start: 2025-08-28

## 2025-08-28 SDOH — ECONOMIC STABILITY: FOOD INSECURITY: WITHIN THE PAST 12 MONTHS, YOU WORRIED THAT YOUR FOOD WOULD RUN OUT BEFORE YOU GOT MONEY TO BUY MORE.: NEVER TRUE

## 2025-08-28 SDOH — ECONOMIC STABILITY: FOOD INSECURITY: WITHIN THE PAST 12 MONTHS, THE FOOD YOU BOUGHT JUST DIDN'T LAST AND YOU DIDN'T HAVE MONEY TO GET MORE.: NEVER TRUE

## 2025-08-28 SDOH — ECONOMIC STABILITY: INCOME INSECURITY: IN THE LAST 12 MONTHS, WAS THERE A TIME WHEN YOU WERE NOT ABLE TO PAY THE MORTGAGE OR RENT ON TIME?: NO

## 2025-08-28 SDOH — ECONOMIC STABILITY: TRANSPORTATION INSECURITY
IN THE PAST 12 MONTHS, HAS LACK OF TRANSPORTATION KEPT YOU FROM MEETINGS, WORK, OR FROM GETTING THINGS NEEDED FOR DAILY LIVING?: NO

## 2025-08-28 SDOH — ECONOMIC STABILITY: TRANSPORTATION INSECURITY
IN THE PAST 12 MONTHS, HAS THE LACK OF TRANSPORTATION KEPT YOU FROM MEDICAL APPOINTMENTS OR FROM GETTING MEDICATIONS?: NO

## 2025-08-28 ASSESSMENT — PATIENT HEALTH QUESTIONNAIRE - PHQ9
SUM OF ALL RESPONSES TO PHQ QUESTIONS 1-9: 0
2. FEELING DOWN, DEPRESSED OR HOPELESS: NOT AT ALL
1. LITTLE INTEREST OR PLEASURE IN DOING THINGS: NOT AT ALL
SUM OF ALL RESPONSES TO PHQ9 QUESTIONS 1 & 2: 0
SUM OF ALL RESPONSES TO PHQ QUESTIONS 1-9: 0
2. FEELING DOWN, DEPRESSED OR HOPELESS: NOT AT ALL
1. LITTLE INTEREST OR PLEASURE IN DOING THINGS: NOT AT ALL